# Patient Record
Sex: MALE | Race: OTHER | HISPANIC OR LATINO | URBAN - METROPOLITAN AREA
[De-identification: names, ages, dates, MRNs, and addresses within clinical notes are randomized per-mention and may not be internally consistent; named-entity substitution may affect disease eponyms.]

---

## 2021-11-10 ENCOUNTER — INPATIENT (INPATIENT)
Facility: HOSPITAL | Age: 71
LOS: 9 days | Discharge: HOME HEALTH | End: 2021-11-20
Attending: HOSPITALIST | Admitting: HOSPITALIST
Payer: MEDICARE

## 2021-11-10 VITALS
WEIGHT: 151.9 LBS | DIASTOLIC BLOOD PRESSURE: 73 MMHG | RESPIRATION RATE: 16 BRPM | TEMPERATURE: 98 F | SYSTOLIC BLOOD PRESSURE: 168 MMHG | HEART RATE: 113 BPM | OXYGEN SATURATION: 81 %

## 2021-11-10 DIAGNOSIS — I26.99 OTHER PULMONARY EMBOLISM WITHOUT ACUTE COR PULMONALE: ICD-10-CM

## 2021-11-10 LAB
ALBUMIN SERPL ELPH-MCNC: 2 G/DL — LOW (ref 3.3–5)
ALBUMIN SERPL ELPH-MCNC: 2.4 G/DL — LOW (ref 3.3–5)
ALP SERPL-CCNC: 415 U/L — HIGH (ref 40–120)
ALP SERPL-CCNC: 510 U/L — HIGH (ref 40–120)
ALT FLD-CCNC: 109 U/L — HIGH (ref 12–78)
ALT FLD-CCNC: 136 U/L — HIGH (ref 12–78)
ANION GAP SERPL CALC-SCNC: 7 MMOL/L — SIGNIFICANT CHANGE UP (ref 5–17)
AST SERPL-CCNC: 109 U/L — HIGH (ref 15–37)
AST SERPL-CCNC: 66 U/L — HIGH (ref 15–37)
BASE EXCESS BLDA CALC-SCNC: 0.1 MMOL/L — SIGNIFICANT CHANGE UP (ref -2–2)
BASOPHILS # BLD AUTO: 0.05 K/UL — SIGNIFICANT CHANGE UP (ref 0–0.2)
BASOPHILS NFR BLD AUTO: 0.4 % — SIGNIFICANT CHANGE UP (ref 0–2)
BILIRUB DIRECT SERPL-MCNC: 0.24 MG/DL — HIGH (ref 0.05–0.2)
BILIRUB INDIRECT FLD-MCNC: 0.3 MG/DL — SIGNIFICANT CHANGE UP (ref 0.2–1)
BILIRUB SERPL-MCNC: 0.5 MG/DL — SIGNIFICANT CHANGE UP (ref 0.2–1.2)
BILIRUB SERPL-MCNC: 0.7 MG/DL — SIGNIFICANT CHANGE UP (ref 0.2–1.2)
BLOOD GAS COMMENTS: SIGNIFICANT CHANGE UP
BLOOD GAS COMMENTS: SIGNIFICANT CHANGE UP
BLOOD GAS SOURCE: SIGNIFICANT CHANGE UP
BUN SERPL-MCNC: 17 MG/DL — SIGNIFICANT CHANGE UP (ref 7–23)
CALCIUM SERPL-MCNC: 8.7 MG/DL — SIGNIFICANT CHANGE UP (ref 8.5–10.1)
CHLORIDE SERPL-SCNC: 105 MMOL/L — SIGNIFICANT CHANGE UP (ref 96–108)
CO2 SERPL-SCNC: 27 MMOL/L — SIGNIFICANT CHANGE UP (ref 22–31)
CREAT SERPL-MCNC: 0.85 MG/DL — SIGNIFICANT CHANGE UP (ref 0.5–1.3)
CREAT SERPL-MCNC: 1.04 MG/DL — SIGNIFICANT CHANGE UP (ref 0.5–1.3)
D DIMER BLD IA.RAPID-MCNC: HIGH NG/ML DDU
EOSINOPHIL # BLD AUTO: 0.1 K/UL — SIGNIFICANT CHANGE UP (ref 0–0.5)
EOSINOPHIL NFR BLD AUTO: 0.9 % — SIGNIFICANT CHANGE UP (ref 0–6)
FIBRINOGEN PPP-MCNC: 481 MG/DL — SIGNIFICANT CHANGE UP (ref 290–520)
GLUCOSE SERPL-MCNC: 147 MG/DL — HIGH (ref 70–99)
HCO3 BLDA-SCNC: 23 MMOL/L — SIGNIFICANT CHANGE UP (ref 21–29)
HCT VFR BLD CALC: 38.6 % — LOW (ref 39–50)
HGB BLD-MCNC: 12.7 G/DL — LOW (ref 13–17)
HOROWITZ INDEX BLDA+IHG-RTO: 32 — SIGNIFICANT CHANGE UP
IMM GRANULOCYTES NFR BLD AUTO: 0.4 % — SIGNIFICANT CHANGE UP (ref 0–1.5)
INR BLD: 1.36 RATIO — HIGH (ref 0.88–1.16)
INR BLD: 1.43 RATIO — HIGH (ref 0.88–1.16)
LACTATE SERPL-SCNC: 2.1 MMOL/L — HIGH (ref 0.7–2)
LYMPHOCYTES # BLD AUTO: 1.37 K/UL — SIGNIFICANT CHANGE UP (ref 1–3.3)
LYMPHOCYTES # BLD AUTO: 11.6 % — LOW (ref 13–44)
MCHC RBC-ENTMCNC: 29.5 PG — SIGNIFICANT CHANGE UP (ref 27–34)
MCHC RBC-ENTMCNC: 32.9 GM/DL — SIGNIFICANT CHANGE UP (ref 32–36)
MCV RBC AUTO: 89.8 FL — SIGNIFICANT CHANGE UP (ref 80–100)
MONOCYTES # BLD AUTO: 0.82 K/UL — SIGNIFICANT CHANGE UP (ref 0–0.9)
MONOCYTES NFR BLD AUTO: 7 % — SIGNIFICANT CHANGE UP (ref 2–14)
NEUTROPHILS # BLD AUTO: 9.37 K/UL — HIGH (ref 1.8–7.4)
NEUTROPHILS NFR BLD AUTO: 79.7 % — HIGH (ref 43–77)
NRBC # BLD: 0 /100 WBCS — SIGNIFICANT CHANGE UP (ref 0–0)
NT-PROBNP SERPL-SCNC: 234 PG/ML — HIGH (ref 0–125)
PCO2 BLDA: 34 MMHG — SIGNIFICANT CHANGE UP (ref 32–46)
PH BLD: 7.45 — SIGNIFICANT CHANGE UP (ref 7.35–7.45)
PLATELET # BLD AUTO: 172 K/UL — SIGNIFICANT CHANGE UP (ref 150–400)
PO2 BLDA: 59 MMHG — LOW (ref 74–108)
POTASSIUM SERPL-MCNC: 4.4 MMOL/L — SIGNIFICANT CHANGE UP (ref 3.5–5.3)
POTASSIUM SERPL-SCNC: 4.4 MMOL/L — SIGNIFICANT CHANGE UP (ref 3.5–5.3)
PROT SERPL-MCNC: 6.7 GM/DL — SIGNIFICANT CHANGE UP (ref 6–8.3)
PROT SERPL-MCNC: 8.3 GM/DL — SIGNIFICANT CHANGE UP (ref 6–8.3)
PROTHROM AB SERPL-ACNC: 15.5 SEC — HIGH (ref 10.6–13.6)
PROTHROM AB SERPL-ACNC: 16.3 SEC — HIGH (ref 10.6–13.6)
RBC # BLD: 4.3 M/UL — SIGNIFICANT CHANGE UP (ref 4.2–5.8)
RBC # FLD: 13.4 % — SIGNIFICANT CHANGE UP (ref 10.3–14.5)
SAO2 % BLDA: 89 % — LOW (ref 92–96)
SODIUM SERPL-SCNC: 139 MMOL/L — SIGNIFICANT CHANGE UP (ref 135–145)
TROPONIN I, HIGH SENSITIVITY RESULT: 13.8 NG/L — SIGNIFICANT CHANGE UP
WBC # BLD: 11.76 K/UL — HIGH (ref 3.8–10.5)
WBC # FLD AUTO: 11.76 K/UL — HIGH (ref 3.8–10.5)

## 2021-11-10 PROCEDURE — 93010 ELECTROCARDIOGRAM REPORT: CPT

## 2021-11-10 PROCEDURE — 99291 CRITICAL CARE FIRST HOUR: CPT | Mod: CS

## 2021-11-10 PROCEDURE — 99223 1ST HOSP IP/OBS HIGH 75: CPT

## 2021-11-10 PROCEDURE — 71275 CT ANGIOGRAPHY CHEST: CPT | Mod: 26,MA

## 2021-11-10 RX ORDER — REMDESIVIR 5 MG/ML
INJECTION INTRAVENOUS
Refills: 0 | Status: DISCONTINUED | OUTPATIENT
Start: 2021-11-10 | End: 2021-11-10

## 2021-11-10 RX ORDER — DEXAMETHASONE 0.5 MG/5ML
10 ELIXIR ORAL DAILY
Refills: 0 | Status: DISCONTINUED | OUTPATIENT
Start: 2021-11-10 | End: 2021-11-11

## 2021-11-10 RX ORDER — HEPARIN SODIUM 5000 [USP'U]/ML
INJECTION INTRAVENOUS; SUBCUTANEOUS
Qty: 25000 | Refills: 0 | Status: DISCONTINUED | OUTPATIENT
Start: 2021-11-10 | End: 2021-11-11

## 2021-11-10 RX ORDER — ATORVASTATIN CALCIUM 80 MG/1
20 TABLET, FILM COATED ORAL AT BEDTIME
Refills: 0 | Status: DISCONTINUED | OUTPATIENT
Start: 2021-11-10 | End: 2021-11-20

## 2021-11-10 RX ORDER — HEPARIN SODIUM 5000 [USP'U]/ML
2500 INJECTION INTRAVENOUS; SUBCUTANEOUS EVERY 6 HOURS
Refills: 0 | Status: DISCONTINUED | OUTPATIENT
Start: 2021-11-10 | End: 2021-11-11

## 2021-11-10 RX ORDER — LEVOTHYROXINE SODIUM 125 MCG
75 TABLET ORAL DAILY
Refills: 0 | Status: DISCONTINUED | OUTPATIENT
Start: 2021-11-10 | End: 2021-11-20

## 2021-11-10 RX ORDER — ACETAMINOPHEN 500 MG
650 TABLET ORAL EVERY 4 HOURS
Refills: 0 | Status: DISCONTINUED | OUTPATIENT
Start: 2021-11-10 | End: 2021-11-20

## 2021-11-10 RX ORDER — REMDESIVIR 5 MG/ML
INJECTION INTRAVENOUS
Refills: 0 | Status: COMPLETED | OUTPATIENT
Start: 2021-11-10 | End: 2021-11-15

## 2021-11-10 RX ORDER — REMDESIVIR 5 MG/ML
200 INJECTION INTRAVENOUS EVERY 24 HOURS
Refills: 0 | Status: DISCONTINUED | OUTPATIENT
Start: 2021-11-10 | End: 2021-11-10

## 2021-11-10 RX ORDER — SODIUM CHLORIDE 9 MG/ML
1000 INJECTION, SOLUTION INTRAVENOUS ONCE
Refills: 0 | Status: COMPLETED | OUTPATIENT
Start: 2021-11-10 | End: 2021-11-10

## 2021-11-10 RX ORDER — HEPARIN SODIUM 5000 [USP'U]/ML
5500 INJECTION INTRAVENOUS; SUBCUTANEOUS EVERY 6 HOURS
Refills: 0 | Status: DISCONTINUED | OUTPATIENT
Start: 2021-11-10 | End: 2021-11-11

## 2021-11-10 RX ORDER — DEXAMETHASONE 0.5 MG/5ML
10 ELIXIR ORAL ONCE
Refills: 0 | Status: COMPLETED | OUTPATIENT
Start: 2021-11-10 | End: 2021-11-10

## 2021-11-10 RX ORDER — HEPARIN SODIUM 5000 [USP'U]/ML
5500 INJECTION INTRAVENOUS; SUBCUTANEOUS ONCE
Refills: 0 | Status: COMPLETED | OUTPATIENT
Start: 2021-11-10 | End: 2021-11-10

## 2021-11-10 RX ORDER — ATORVASTATIN CALCIUM 80 MG/1
1 TABLET, FILM COATED ORAL
Qty: 0 | Refills: 0 | DISCHARGE

## 2021-11-10 RX ORDER — LEVOTHYROXINE SODIUM 125 MCG
1 TABLET ORAL
Qty: 0 | Refills: 0 | DISCHARGE

## 2021-11-10 RX ORDER — REMDESIVIR 5 MG/ML
200 INJECTION INTRAVENOUS EVERY 24 HOURS
Refills: 0 | Status: COMPLETED | OUTPATIENT
Start: 2021-11-10 | End: 2021-11-11

## 2021-11-10 RX ADMIN — HEPARIN SODIUM 5500 UNIT(S): 5000 INJECTION INTRAVENOUS; SUBCUTANEOUS at 23:34

## 2021-11-10 RX ADMIN — HEPARIN SODIUM 1200 UNIT(S)/HR: 5000 INJECTION INTRAVENOUS; SUBCUTANEOUS at 23:34

## 2021-11-10 RX ADMIN — SODIUM CHLORIDE 1000 MILLILITER(S): 9 INJECTION, SOLUTION INTRAVENOUS at 21:58

## 2021-11-10 RX ADMIN — SODIUM CHLORIDE 1000 MILLILITER(S): 9 INJECTION, SOLUTION INTRAVENOUS at 23:35

## 2021-11-10 NOTE — ED PROVIDER NOTE - CLINICAL SUMMARY MEDICAL DECISION MAKING FREE TEXT BOX
hypoxia from covid. candidate for dex and remdes. rule out pe. admit. hypoxia from covid. candidate for dex and remdes. rule out pe. admit.  cta shows pe. start heparin.

## 2021-11-10 NOTE — H&P ADULT - NSHPPHYSICALEXAM_GEN_ALL_CORE
PHYSICAL EXAM:  General: in no acute distress  Eyes: PERRLA, EOMI; conjunctiva and sclera clear  Head: Normocephalic; atraumatic  ENMT: No nasal discharge; airway clear  Neck: Supple; non tender; no masses  Respiratory: bibasilar crackles with expiratory wheezing heard loudest towards the base  Cardiovascular: Regular rate and rhythm. S1 and S2 Normal; No murmurs, gallops or rubs  Gastrointestinal: Soft non-tender non-distended; Normal bowel sounds  Extremities: Normal range of motion, No clubbing, cyanosis or edema  Vascular: Peripheral pulses palpable 2+ bilaterally  Neurological: Alert and oriented x4  Psychiatric: Cooperative and appropriate

## 2021-11-10 NOTE — ED PROVIDER NOTE - PHYSICAL EXAMINATION
Gen: Alert, NAD  Head: NC, AT   Eyes: PERRL, EOMI, normal lids/conjunctiva  ENT: normal hearing, patent oropharynx without erythema/exudate, uvula midline  Neck: supple, no tenderness, Trachea midline  Pulm: bl crackles   CV: tachycardia. no M/R/G, 2+ radial and dp pulses bl, no edema  Abd: soft, NT/ND, +BS, no hepatosplenomegaly  Mskel: extremities x4 with normal ROM and no joint effusions. no ctl spine ttp.   Skin: no rash, no bruising   Neuro: AAOx3, no sensory/motor deficits, CN 2-12 intact

## 2021-11-10 NOTE — ED PROVIDER NOTE - OBJECTIVE STATEMENT
71m hx emphysema pw sob. 8 days ago developed ha and then cough prod of yellow phlegm with blood tinge. last night developed sob and weakness. poor appetite as well. o2 sat at home was in the 70s. ros neg for ha, vision loss, rhinorrhea, cp, abd pain, vomiting, fever, chills, numbness, dysuria, back pain, testicular pain  has not taken any medications for symptoms

## 2021-11-10 NOTE — H&P ADULT - NSHPREVIEWOFSYSTEMS_GEN_ALL_CORE
REVIEW OF SYSTEMS  General: denies weakness, malaise  Skin/Breast: no new rash  Ophthalmologic: no change in vision  ENMT: no dysphagia, throat pain or change in hearing  Respiratory and Thorax: +difficulty breathing ; + pleuritic chest pain  Cardiovascular: no palpitations or PND, orthopnea  Gastrointestinal: no abdominal pain, normal bowel movement, no dark stools  Genitourinary: no difficulty urinating, no burning urination  Musculoskeletal: no myalgia/arthrlagia  Neurological: no weakness, numbness, change in gait  Psychiatric: no depression, anxiety  Hematology/Lymphatics: denies easy bruising or bleeding  Endocrine: no polyuria, polydipsia

## 2021-11-10 NOTE — H&P ADULT - HISTORY OF PRESENT ILLNESS
70 yo male with history of HLD, hypothyroidism who presents from home after testing positive for COVID on 11/4. Patient went to PCP on 11/4 and got tested, family says they just only got the results today. He has had increased SOB and cough at home. Pleuritic chest pain bilaterally. He did receive the first dose of the moderna vaccine on 10/4 and was due to get the next dose last week before may symptoms. At this time patient feels better on oxygen. He is 92% on 4LPM via NC. CT chest with contrast on admission showed bilateral subsegmental PE and patient started on heparin drip. Medicine called for admission.

## 2021-11-10 NOTE — H&P ADULT - ASSESSMENT
72 yo male with history of HLD, hypothyroidism who presents from home after testing positive for COVID on 11/4. Patient went to PCP on 11/4 and got tested, family says they just only got the results today. He has had increased SOB and cough at home. Pleuritic chest pain bilaterally. He did receive the first dose of the moderna vaccine on 10/4 and was due to get the next dose last week before may symptoms. At this time patient feels better on oxygen. He is 92% on 4LPM via NC. CT chest with contrast on admission showed bilateral subsegmental PE and patient started on heparin drip. Medicine called for admission.    #acute hypoxemic resp failure 2/2 COVID 19 pneumonia  - admit to tele  - started on dexamethasone 10mg IVPB by ED on admission  - continue dex 6mg daily for 5 days  - start on remdesivir  - consult ID if deteriorates  - incentive spirometer; instructed on use  - proning encouraged  - OOB to chair when awake  - albuterol PRN  - oxygen to be titrated as allows    #bilateral PE  - continue with heparin IV  - if remains stable can transition to BID lovenox    #hypothyroidism  - continue levothyroxine    #hyperlipidemia  - continue atorvastatin    #DVT ppx  - full dose AC as above

## 2021-11-10 NOTE — ED ADULT NURSE NOTE - ED STAT RN HANDOFF DETAILS
Report given to receiving RN francesca ,pts history, current condition and reason for admission discussed, safety concerns addressed and reviewed, pt currently in stable condition, IV flushes for patency and site shows no signs or symptoms of infiltrate, dressing is clean dry and intact, pt is aware of plan of care. Pt education deemed successful at time of report after patient demonstrates successful teach back for proficiency. adult nurse note done in downtime notes.

## 2021-11-11 DIAGNOSIS — R79.89 OTHER SPECIFIED ABNORMAL FINDINGS OF BLOOD CHEMISTRY: ICD-10-CM

## 2021-11-11 DIAGNOSIS — U07.1 COVID-19: ICD-10-CM

## 2021-11-11 DIAGNOSIS — K21.9 GASTRO-ESOPHAGEAL REFLUX DISEASE WITHOUT ESOPHAGITIS: ICD-10-CM

## 2021-11-11 DIAGNOSIS — J96.01 ACUTE RESPIRATORY FAILURE WITH HYPOXIA: ICD-10-CM

## 2021-11-11 DIAGNOSIS — I26.99 OTHER PULMONARY EMBOLISM WITHOUT ACUTE COR PULMONALE: ICD-10-CM

## 2021-11-11 LAB
A1C WITH ESTIMATED AVERAGE GLUCOSE RESULT: 6 % — HIGH (ref 4–5.6)
ALBUMIN SERPL ELPH-MCNC: 1.9 G/DL — LOW (ref 3.3–5)
ALBUMIN SERPL ELPH-MCNC: 1.9 G/DL — LOW (ref 3.3–5)
ALP SERPL-CCNC: 402 U/L — HIGH (ref 40–120)
ALP SERPL-CCNC: 402 U/L — HIGH (ref 40–120)
ALT FLD-CCNC: 111 U/L — HIGH (ref 12–78)
ALT FLD-CCNC: 111 U/L — HIGH (ref 12–78)
ANION GAP SERPL CALC-SCNC: 6 MMOL/L — SIGNIFICANT CHANGE UP (ref 5–17)
APTT BLD: 105.8 SEC — HIGH (ref 27.5–35.5)
APTT BLD: 79.1 SEC — HIGH (ref 27.5–35.5)
APTT BLD: 82.8 SEC — HIGH (ref 27.5–35.5)
APTT BLD: 95.8 SEC — HIGH (ref 27.5–35.5)
AST SERPL-CCNC: 70 U/L — HIGH (ref 15–37)
AST SERPL-CCNC: 70 U/L — HIGH (ref 15–37)
BILIRUB DIRECT SERPL-MCNC: 0.16 MG/DL — SIGNIFICANT CHANGE UP (ref 0.05–0.2)
BILIRUB INDIRECT FLD-MCNC: 0.1 MG/DL — LOW (ref 0.2–1)
BILIRUB SERPL-MCNC: 0.3 MG/DL — SIGNIFICANT CHANGE UP (ref 0.2–1.2)
BILIRUB SERPL-MCNC: 0.3 MG/DL — SIGNIFICANT CHANGE UP (ref 0.2–1.2)
BUN SERPL-MCNC: 12 MG/DL — SIGNIFICANT CHANGE UP (ref 7–23)
CALCIUM SERPL-MCNC: 8.1 MG/DL — LOW (ref 8.5–10.1)
CHLORIDE SERPL-SCNC: 110 MMOL/L — HIGH (ref 96–108)
CO2 SERPL-SCNC: 24 MMOL/L — SIGNIFICANT CHANGE UP (ref 22–31)
CREAT SERPL-MCNC: 0.73 MG/DL — SIGNIFICANT CHANGE UP (ref 0.5–1.3)
CREAT SERPL-MCNC: 0.73 MG/DL — SIGNIFICANT CHANGE UP (ref 0.5–1.3)
ESTIMATED AVERAGE GLUCOSE: 126 MG/DL — HIGH (ref 68–114)
GLUCOSE SERPL-MCNC: 182 MG/DL — HIGH (ref 70–99)
HCT VFR BLD CALC: 33 % — LOW (ref 39–50)
HCV AB S/CO SERPL IA: 0.23 S/CO — SIGNIFICANT CHANGE UP (ref 0–0.99)
HCV AB SERPL-IMP: SIGNIFICANT CHANGE UP
HGB BLD-MCNC: 10.7 G/DL — LOW (ref 13–17)
INR BLD: 1.42 RATIO — HIGH (ref 0.88–1.16)
MCHC RBC-ENTMCNC: 29.3 PG — SIGNIFICANT CHANGE UP (ref 27–34)
MCHC RBC-ENTMCNC: 32.4 GM/DL — SIGNIFICANT CHANGE UP (ref 32–36)
MCV RBC AUTO: 90.4 FL — SIGNIFICANT CHANGE UP (ref 80–100)
NRBC # BLD: 0 /100 WBCS — SIGNIFICANT CHANGE UP (ref 0–0)
PLATELET # BLD AUTO: 180 K/UL — SIGNIFICANT CHANGE UP (ref 150–400)
POTASSIUM SERPL-MCNC: 4.6 MMOL/L — SIGNIFICANT CHANGE UP (ref 3.5–5.3)
POTASSIUM SERPL-SCNC: 4.6 MMOL/L — SIGNIFICANT CHANGE UP (ref 3.5–5.3)
PROT SERPL-MCNC: 6.8 GM/DL — SIGNIFICANT CHANGE UP (ref 6–8.3)
PROT SERPL-MCNC: 6.8 GM/DL — SIGNIFICANT CHANGE UP (ref 6–8.3)
PROTHROM AB SERPL-ACNC: 16.2 SEC — HIGH (ref 10.6–13.6)
RBC # BLD: 3.65 M/UL — LOW (ref 4.2–5.8)
RBC # FLD: 13.3 % — SIGNIFICANT CHANGE UP (ref 10.3–14.5)
SODIUM SERPL-SCNC: 140 MMOL/L — SIGNIFICANT CHANGE UP (ref 135–145)
WBC # BLD: 7.62 K/UL — SIGNIFICANT CHANGE UP (ref 3.8–10.5)
WBC # FLD AUTO: 7.62 K/UL — SIGNIFICANT CHANGE UP (ref 3.8–10.5)

## 2021-11-11 PROCEDURE — 99223 1ST HOSP IP/OBS HIGH 75: CPT

## 2021-11-11 PROCEDURE — 99233 SBSQ HOSP IP/OBS HIGH 50: CPT

## 2021-11-11 RX ORDER — HEPARIN SODIUM 5000 [USP'U]/ML
1200 INJECTION INTRAVENOUS; SUBCUTANEOUS
Qty: 25000 | Refills: 0 | Status: DISCONTINUED | OUTPATIENT
Start: 2021-11-11 | End: 2021-11-15

## 2021-11-11 RX ORDER — HEPARIN SODIUM 5000 [USP'U]/ML
6000 INJECTION INTRAVENOUS; SUBCUTANEOUS ONCE
Refills: 0 | Status: DISCONTINUED | OUTPATIENT
Start: 2021-11-11 | End: 2021-11-11

## 2021-11-11 RX ORDER — REMDESIVIR 5 MG/ML
100 INJECTION INTRAVENOUS EVERY 24 HOURS
Refills: 0 | Status: COMPLETED | OUTPATIENT
Start: 2021-11-12 | End: 2021-11-15

## 2021-11-11 RX ORDER — HEPARIN SODIUM 5000 [USP'U]/ML
6000 INJECTION INTRAVENOUS; SUBCUTANEOUS EVERY 6 HOURS
Refills: 0 | Status: DISCONTINUED | OUTPATIENT
Start: 2021-11-11 | End: 2021-11-15

## 2021-11-11 RX ORDER — DEXAMETHASONE 0.5 MG/5ML
6 ELIXIR ORAL DAILY
Refills: 0 | Status: COMPLETED | OUTPATIENT
Start: 2021-11-11 | End: 2021-11-15

## 2021-11-11 RX ORDER — HEPARIN SODIUM 5000 [USP'U]/ML
3000 INJECTION INTRAVENOUS; SUBCUTANEOUS EVERY 6 HOURS
Refills: 0 | Status: DISCONTINUED | OUTPATIENT
Start: 2021-11-11 | End: 2021-11-15

## 2021-11-11 RX ADMIN — ATORVASTATIN CALCIUM 20 MILLIGRAM(S): 80 TABLET, FILM COATED ORAL at 22:02

## 2021-11-11 RX ADMIN — HEPARIN SODIUM 1200 UNIT(S)/HR: 5000 INJECTION INTRAVENOUS; SUBCUTANEOUS at 08:38

## 2021-11-11 RX ADMIN — Medication 75 MICROGRAM(S): at 05:25

## 2021-11-11 RX ADMIN — Medication 6 MILLIGRAM(S): at 05:24

## 2021-11-11 RX ADMIN — HEPARIN SODIUM 1200 UNIT(S)/HR: 5000 INJECTION INTRAVENOUS; SUBCUTANEOUS at 23:58

## 2021-11-11 RX ADMIN — HEPARIN SODIUM 1200 UNIT(S)/HR: 5000 INJECTION INTRAVENOUS; SUBCUTANEOUS at 16:28

## 2021-11-11 RX ADMIN — REMDESIVIR 500 MILLIGRAM(S): 5 INJECTION INTRAVENOUS at 05:24

## 2021-11-11 RX ADMIN — Medication 30 MILLILITER(S): at 17:44

## 2021-11-11 NOTE — PROGRESS NOTE ADULT - ASSESSMENT
72 yo male with history of HLD, hypothyroidism who presents from home after testing positive for COVID on 11/4. Patient went to PCP on 11/4 and got tested, family says they just only got the results today. He has had increased SOB and cough at home. Pleuritic chest pain bilaterally. He did receive the first dose of the moderna vaccine on 10/4 and was due to get the next dose last week before may symptoms. At this time patient feels better on oxygen. He is 92% on 4LPM via NC. CT chest with contrast on admission showed bilateral subsegmental PE and patient started on heparin drip. Medicine called for admission.    #acute hypoxemic resp failure 2/2 COVID 19 pneumonia    -continue on dexamethasone ,  remdesivir  - consult ID and pulm   incentive spirometer; instructed on use  - proning encouraged  - OOB to chair when awake  - albuterol PRN  - oxygen to be titrated as allows    #bilateral PE  - continue with heparin IV  - if remains stable can transition to BID lovenox    #hypothyroidism  - continue levothyroxine    #hyperlipidemia  - continue atorvastatin    #DVT ppx  - full dose AC as above 70 yo male with history of HLD, hypothyroidism who presents from home after testing positive for COVID on 11/4. Patient went to PCP on 11/4 and got tested, family says they just only got the results today. He has had increased SOB and cough at home. Pleuritic chest pain bilaterally. He did receive the first dose of the moderna vaccine on 10/4 and was due to get the next dose last week before may symptoms. At this time patient feels better on oxygen. He is 92% on 4LPM via NC. CT chest with contrast on admission showed bilateral subsegmental PE and patient started on heparin drip. Medicine called for admission.    #acute hypoxemic resp failure 2/2 COVID 19 pneumonia and sepsis/poa    -continue on dexamethasone ,  remdesivir  - consult ID and pulm   incentive spirometer; instructed on use  - proning encouraged  - OOB to chair when awake  - albuterol PRN  - oxygen to be titrated as allows    #bilateral PE  - continue with heparin IV  - if remains stable can transition to BID lovenox    #hypothyroidism  - continue levothyroxine    #hyperlipidemia  - continue atorvastatin    #DVT ppx  - full dose AC as above

## 2021-11-11 NOTE — CONSULT NOTE ADULT - ASSESSMENT
72 yo male with history of HLD, hypothyroidism who presents from home after testing positive for COVID on 11/4.   His symptoms started a week before halloween.   Presented hypoxic requiring 4L nasal cannula and was quicklly moved to [facility] where he feels more comfortable and saturation is better.   CTA (I personally reviewed) on admission showed bilateral subsegmental PE as well as GGO bilaterally and patient started on heparin drip.     #acute hypoxemic resp failure 2/2 COVID 19 pneumonia  #pulmonary embolism    #high serum lactate  #Gerd    #COVID  Monitor clinically.  Monitor Oxygenation  O2 supplementation.  Remdesivir - up to 5 days depending on clinical course.  Monitor CBC, CMP daily  Ferritin, CRP, and D dimer q 48 hrs.  IV Dexamethasone 6mg q24hrs x10 days  full dose Anticoagulation for pulmonary embolism   Treatment options are limited-this as well as limitations of data discussed with pt.  Monitor for any bacterial superinfection/complications.  discussed tocilizumab with patient and daughter: since he has been sick for more then 2 weeks and the benefit is small with a infectious risks we came to the conclusion of not giving tocilizumab   This tx plan was formulated utilizing my clinical judgement, currently available local/regional anecdotal information, organizational treatment recommendations with COVID-19 specific considerations given rapidly changing information available.     #PE  AC per medicine  consider changing to full dose lovenox   wean oxygen   Transthoracic Echocardiogram     #lactate   was elevated on admission likely related to hypoxia  improved with oxygen and fluids    #GERD  patient takes omeprazole at home  he is on high dose steroids which he feels is worsening his gastritis  please start PPI     D/W Dr. Ibanez and patients daughter    Vinay Vernon, DO  Infectious Disease Attending  Pager [phone]  After 5pm/weekends please call [phone] for all inquiries and new consults

## 2021-11-11 NOTE — PROGRESS NOTE ADULT - SUBJECTIVE AND OBJECTIVE BOX
Patient is a 71y old  Male who presents with a chief complaint of     OVERNIGHT EVENTS:    MEDICATIONS  (STANDING):  atorvastatin 20 milliGRAM(s) Oral at bedtime  dexAMETHasone  Injectable 6 milliGRAM(s) IV Push daily  heparin  Infusion.  Unit(s)/Hr (12 mL/Hr) IV Continuous <Continuous>  levothyroxine 75 MICROGram(s) Oral daily  remdesivir  IVPB   IV Intermittent     MEDICATIONS  (PRN):  acetaminophen     Tablet .. 650 milliGRAM(s) Oral every 4 hours PRN Temp greater or equal to 38.5C (101.3F)  heparin   Injectable 5500 Unit(s) IV Push every 6 hours PRN For aPTT less than 40  heparin   Injectable 2500 Unit(s) IV Push every 6 hours PRN For aPTT between 40 - 57      Allergies    No Known Allergies    Intolerances        SUBJECTIVE: in bed in NAD, no acute events overnight     T(F): 97.4 (11-11-21 @ 13:09), Max: 98.2 (11-10-21 @ 19:40)  HR: 78 (11-11-21 @ 13:09) (64 - 113)  BP: 114/62 (11-11-21 @ 13:09) (99/59 - 168/73)  RR: 18 (11-11-21 @ 13:09) (15 - 21)  SpO2: 93% (11-11-21 @ 13:09) (81% - 97%)  Wt(kg): --    PHYSICAL EXAM:  GENERAL: NAD, well-groomed, well-developed  HEAD:  Atraumatic, Normocephalic  EYES: EOMI, PERRLA, conjunctiva and sclera clear  ENMT: No tonsillar erythema, exudates, or enlargement; Moist mucous membranes, Good dentition, No lesions  NECK: Supple, No JVD, Normal thyroid  CHEST/LUNG: Clear to  auscultation bilaterally; No rales, rhonchi, wheezing, or rubs  bilaterally  HEART: Regular rate and rhythm; No murmurs, rubs, or gallops  ABDOMEN: Soft, Nontender, Nondistended; Bowel sounds present  EXTREMITIES:  2+ Peripheral Pulses, No clubbing, cyanosis, or edema BL LE  SKIN: No rashes or lesions  NERVOUS SYSTEM:  Alert & Oriented X3, Good concentration; Motor Strength 5/5 B/L upper and lower extremities;   DTRs 2+ intact and symmetric, sensation intact BL    LABS:                        10.7   7.62  )-----------( 180      ( 11 Nov 2021 07:01 )             33.0     11-11    140  |  110<H>  |  12  ----------------------------<  182<H>  4.6   |  24  |  0.73    Ca    8.1<L>      11 Nov 2021 07:01    TPro  6.8  /  Alb  1.9<L>  /  TBili  0.3  /  DBili  .16  /  AST  70<H>  /  ALT  111<H>  /  AlkPhos  402<H>  11-11    PT/INR - ( 11 Nov 2021 07:01 )   PT: 16.2 sec;   INR: 1.42 ratio         PTT - ( 11 Nov 2021 09:49 )  PTT:105.8 sec    Cultures;   CAPILLARY BLOOD GLUCOSE        Lipid panel:           RADIOLOGY & ADDITIONAL TESTS:  < from: CT Angio Chest PE Protocol w/ IV Cont (11.10.21 @ 22:27) >  IMPRESSION:    Bilateral segmental and subsegmental pulmonary emboli in all lobes. No evidence of right heart strain.    Pulmonary findings typical of Covid 19 pneumonia.    Mild mediastinal adenopathy, likely reactive.    Findings discussed with JORDEN Orellana via phone on 11/10/2021 10:30 PM by Dr. Costa Huynh with read back confirmation.        < end of copied text >      Imaging Personally Reviewed:  [ x] YES      Consultant(s) Notes Reviewed:  [x ] YES     Care Discussed with [x ] Consultants [X ] Patient [x ] Family  [x ]    [x ]  Other; RN

## 2021-11-12 LAB
ALBUMIN SERPL ELPH-MCNC: 2 G/DL — LOW (ref 3.3–5)
ALBUMIN SERPL ELPH-MCNC: 2 G/DL — LOW (ref 3.3–5)
ALP SERPL-CCNC: 447 U/L — HIGH (ref 40–120)
ALP SERPL-CCNC: 447 U/L — HIGH (ref 40–120)
ALT FLD-CCNC: 127 U/L — HIGH (ref 12–78)
ALT FLD-CCNC: 127 U/L — HIGH (ref 12–78)
ANION GAP SERPL CALC-SCNC: 5 MMOL/L — SIGNIFICANT CHANGE UP (ref 5–17)
APTT BLD: 77.7 SEC — HIGH (ref 27.5–35.5)
APTT BLD: 82.4 SEC — HIGH (ref 27.5–35.5)
AST SERPL-CCNC: 81 U/L — HIGH (ref 15–37)
AST SERPL-CCNC: 81 U/L — HIGH (ref 15–37)
BILIRUB DIRECT SERPL-MCNC: 0.19 MG/DL — SIGNIFICANT CHANGE UP (ref 0.05–0.2)
BILIRUB INDIRECT FLD-MCNC: 0.2 MG/DL — SIGNIFICANT CHANGE UP (ref 0.2–1)
BILIRUB SERPL-MCNC: 0.4 MG/DL — SIGNIFICANT CHANGE UP (ref 0.2–1.2)
BILIRUB SERPL-MCNC: 0.4 MG/DL — SIGNIFICANT CHANGE UP (ref 0.2–1.2)
BUN SERPL-MCNC: 18 MG/DL — SIGNIFICANT CHANGE UP (ref 7–23)
CALCIUM SERPL-MCNC: 8.1 MG/DL — LOW (ref 8.5–10.1)
CHLORIDE SERPL-SCNC: 108 MMOL/L — SIGNIFICANT CHANGE UP (ref 96–108)
CO2 SERPL-SCNC: 26 MMOL/L — SIGNIFICANT CHANGE UP (ref 22–31)
COVID-19 NUCLEOCAPSID GAM AB INTERP: POSITIVE
COVID-19 NUCLEOCAPSID TOTAL GAM ANTIBODY RESULT: 53.1 INDEX — HIGH
COVID-19 SPIKE DOMAIN AB INTERP: POSITIVE
COVID-19 SPIKE DOMAIN ANTIBODY RESULT: >250 U/ML — HIGH
CREAT SERPL-MCNC: 0.72 MG/DL — SIGNIFICANT CHANGE UP (ref 0.5–1.3)
CREAT SERPL-MCNC: 0.72 MG/DL — SIGNIFICANT CHANGE UP (ref 0.5–1.3)
GLUCOSE BLDC GLUCOMTR-MCNC: 129 MG/DL — HIGH (ref 70–99)
GLUCOSE BLDC GLUCOMTR-MCNC: 135 MG/DL — HIGH (ref 70–99)
GLUCOSE BLDC GLUCOMTR-MCNC: 172 MG/DL — HIGH (ref 70–99)
GLUCOSE SERPL-MCNC: 129 MG/DL — HIGH (ref 70–99)
HCT VFR BLD CALC: 33.1 % — LOW (ref 39–50)
HGB BLD-MCNC: 10.8 G/DL — LOW (ref 13–17)
INR BLD: 1.38 RATIO — HIGH (ref 0.88–1.16)
MAGNESIUM SERPL-MCNC: 2.2 MG/DL — SIGNIFICANT CHANGE UP (ref 1.6–2.6)
MCHC RBC-ENTMCNC: 29.2 PG — SIGNIFICANT CHANGE UP (ref 27–34)
MCHC RBC-ENTMCNC: 32.6 GM/DL — SIGNIFICANT CHANGE UP (ref 32–36)
MCV RBC AUTO: 89.5 FL — SIGNIFICANT CHANGE UP (ref 80–100)
NRBC # BLD: 0 /100 WBCS — SIGNIFICANT CHANGE UP (ref 0–0)
PHOSPHATE SERPL-MCNC: 2.6 MG/DL — SIGNIFICANT CHANGE UP (ref 2.5–4.5)
PLATELET # BLD AUTO: 206 K/UL — SIGNIFICANT CHANGE UP (ref 150–400)
POTASSIUM SERPL-MCNC: 4.2 MMOL/L — SIGNIFICANT CHANGE UP (ref 3.5–5.3)
POTASSIUM SERPL-SCNC: 4.2 MMOL/L — SIGNIFICANT CHANGE UP (ref 3.5–5.3)
PROT SERPL-MCNC: 6.7 GM/DL — SIGNIFICANT CHANGE UP (ref 6–8.3)
PROT SERPL-MCNC: 6.7 GM/DL — SIGNIFICANT CHANGE UP (ref 6–8.3)
PROTHROM AB SERPL-ACNC: 15.8 SEC — HIGH (ref 10.6–13.6)
RBC # BLD: 3.7 M/UL — LOW (ref 4.2–5.8)
RBC # FLD: 13.4 % — SIGNIFICANT CHANGE UP (ref 10.3–14.5)
SARS-COV-2 IGG+IGM SERPL QL IA: 53.1 INDEX — HIGH
SARS-COV-2 IGG+IGM SERPL QL IA: >250 U/ML — HIGH
SARS-COV-2 IGG+IGM SERPL QL IA: POSITIVE
SARS-COV-2 IGG+IGM SERPL QL IA: POSITIVE
SODIUM SERPL-SCNC: 139 MMOL/L — SIGNIFICANT CHANGE UP (ref 135–145)
WBC # BLD: 14.82 K/UL — HIGH (ref 3.8–10.5)
WBC # FLD AUTO: 14.82 K/UL — HIGH (ref 3.8–10.5)

## 2021-11-12 PROCEDURE — 99233 SBSQ HOSP IP/OBS HIGH 50: CPT

## 2021-11-12 RX ORDER — DEXTROSE 50 % IN WATER 50 %
25 SYRINGE (ML) INTRAVENOUS ONCE
Refills: 0 | Status: DISCONTINUED | OUTPATIENT
Start: 2021-11-12 | End: 2021-11-20

## 2021-11-12 RX ORDER — DEXTROSE 50 % IN WATER 50 %
12.5 SYRINGE (ML) INTRAVENOUS ONCE
Refills: 0 | Status: DISCONTINUED | OUTPATIENT
Start: 2021-11-12 | End: 2021-11-20

## 2021-11-12 RX ORDER — INSULIN LISPRO 100/ML
VIAL (ML) SUBCUTANEOUS AT BEDTIME
Refills: 0 | Status: DISCONTINUED | OUTPATIENT
Start: 2021-11-12 | End: 2021-11-20

## 2021-11-12 RX ORDER — INSULIN LISPRO 100/ML
VIAL (ML) SUBCUTANEOUS
Refills: 0 | Status: DISCONTINUED | OUTPATIENT
Start: 2021-11-12 | End: 2021-11-20

## 2021-11-12 RX ORDER — SODIUM CHLORIDE 9 MG/ML
1000 INJECTION, SOLUTION INTRAVENOUS
Refills: 0 | Status: DISCONTINUED | OUTPATIENT
Start: 2021-11-12 | End: 2021-11-20

## 2021-11-12 RX ORDER — GLUCAGON INJECTION, SOLUTION 0.5 MG/.1ML
1 INJECTION, SOLUTION SUBCUTANEOUS ONCE
Refills: 0 | Status: DISCONTINUED | OUTPATIENT
Start: 2021-11-12 | End: 2021-11-20

## 2021-11-12 RX ORDER — ALBUTEROL 90 UG/1
2 AEROSOL, METERED ORAL EVERY 6 HOURS
Refills: 0 | Status: DISCONTINUED | OUTPATIENT
Start: 2021-11-12 | End: 2021-11-20

## 2021-11-12 RX ORDER — PANTOPRAZOLE SODIUM 20 MG/1
40 TABLET, DELAYED RELEASE ORAL
Refills: 0 | Status: DISCONTINUED | OUTPATIENT
Start: 2021-11-12 | End: 2021-11-20

## 2021-11-12 RX ORDER — DEXTROSE 50 % IN WATER 50 %
15 SYRINGE (ML) INTRAVENOUS ONCE
Refills: 0 | Status: DISCONTINUED | OUTPATIENT
Start: 2021-11-12 | End: 2021-11-20

## 2021-11-12 RX ADMIN — Medication 6 MILLIGRAM(S): at 05:02

## 2021-11-12 RX ADMIN — REMDESIVIR 500 MILLIGRAM(S): 5 INJECTION INTRAVENOUS at 05:02

## 2021-11-12 RX ADMIN — ATORVASTATIN CALCIUM 20 MILLIGRAM(S): 80 TABLET, FILM COATED ORAL at 21:42

## 2021-11-12 RX ADMIN — Medication 30 MILLILITER(S): at 15:42

## 2021-11-12 RX ADMIN — ALBUTEROL 2 PUFF(S): 90 AEROSOL, METERED ORAL at 19:12

## 2021-11-12 RX ADMIN — HEPARIN SODIUM 1200 UNIT(S)/HR: 5000 INJECTION INTRAVENOUS; SUBCUTANEOUS at 15:57

## 2021-11-12 RX ADMIN — Medication 75 MICROGRAM(S): at 05:02

## 2021-11-12 RX ADMIN — HEPARIN SODIUM 1200 UNIT(S)/HR: 5000 INJECTION INTRAVENOUS; SUBCUTANEOUS at 07:47

## 2021-11-12 NOTE — PROGRESS NOTE ADULT - ASSESSMENT
72 yo male with history of HLD, hypothyroidism who presents from home after testing positive for COVID on 11/4.   His symptoms started a week before Halloween   Presented hypoxic requiring 4L nasal cannula and was quicklly moved to [facility] where he feels more comfortable and saturation is better.   CTA (I personally reviewed) on admission showed bilateral subsegmental PE as well as GGO bilaterally and patient started on heparin drip.     11/12: still on [facility], doing better, remains on heparin drip mil d transaminitis      #acute hypoxemic resp failure 2/2 COVID 19 pneumonia  #pulmonary embolism    #high serum lactate  #Gerd    #COVID  Monitor clinically.  Monitor Oxygenation  O2 supplementation.  Remdesivir - up to 5 days depending on clinical course.  Monitor CBC, CMP daily  Ferritin, CRP, and D dimer q 48 hrs.  IV Dexamethasone 6mg q24hrs x10 days  full dose Anticoagulation for pulmonary embolism   Treatment options are limited-this as well as limitations of data discussed with pt.  Monitor for any bacterial superinfection/complications.  discussed tocilizumab with patient and daughter: since he has been sick for more then 2 weeks and the benefit is small with a infectious risks we came to the conclusion of not giving tocilizumab   This tx plan was formulated utilizing my clinical judgement, currently available local/regional anecdotal information, organizational treatment recommendations with COVID-19 specific considerations given rapidly changing information available.    chest patient, proning as tolerated     #PE  AC per medicine  consider changing to full dose lovenox   wean oxygen   consider Transthoracic Echocardiogram     #lactate   was elevated on admission likely related to hypoxia  improved with oxygen and fluids    #GERD  patient takes omeprazole at home  he is on high dose steroids which he feels is worsening his gastritis  please start PPI     D/W Dr. Ibanez and patients daughter    Vinay Vernon,   Infectious Disease Attending  Pager [phone]  After 5pm/weekends please call [phone] for all inquiries and new consults

## 2021-11-12 NOTE — PHARMACOTHERAPY INTERVENTION NOTE - COMMENTS
Recommended initiating insulin sliding scale while patient is receiving dexamethasone for COVID management.

## 2021-11-12 NOTE — PROGRESS NOTE ADULT - SUBJECTIVE AND OBJECTIVE BOX
ARTHUR ZAZUETA  MRN-[id number]      Follow Up:  COVID, pulmonary embolism     Interval History: patient seen and examined he reports feeling much better, minimal cough otherwise ROS is negative     ROS:    [ ] Unobtainable because:  [ x] All other systems negative except as noted    Constitutional: no fever, no chills  Head: no trauma  Eyes: no vision changes, no eye pain  ENT:  no sore throat, no rhinorrhea  Cardiovascular:  no chest pain, no palpitation  Respiratory:  no SOB, no cough  GI:  no abd pain, no vomiting, no diarrhea  urinary: no dysuria, no hematuria, no flank pain  musculoskeletal:  no joint pain, no joint swelling  skin:  no rash  neurology:  no headache, no seizure, no change in mental status  psych: no anxiety, no depression         Allergies  No Known Allergies        ANTIMICROBIALS:  remdesivir  IVPB    remdesivir  IVPB 100 every 24 hours      OTHER MEDS:  acetaminophen     Tablet .. 650 milliGRAM(s) Oral every 4 hours PRN  atorvastatin 20 milliGRAM(s) Oral at bedtime  dexAMETHasone  Injectable 6 milliGRAM(s) IV Push daily  dextrose 40% Gel 15 Gram(s) Oral once  dextrose 5%. 1000 milliLiter(s) IV Continuous <Continuous>  dextrose 5%. 1000 milliLiter(s) IV Continuous <Continuous>  dextrose 50% Injectable 25 Gram(s) IV Push once  dextrose 50% Injectable 12.5 Gram(s) IV Push once  dextrose 50% Injectable 25 Gram(s) IV Push once  glucagon  Injectable 1 milliGRAM(s) IntraMuscular once  heparin   Injectable 6000 Unit(s) IV Push every 6 hours PRN  heparin   Injectable 3000 Unit(s) IV Push every 6 hours PRN  heparin  Infusion. 1200 Unit(s)/Hr IV Continuous <Continuous>  insulin lispro (ADMELOG) corrective regimen sliding scale   SubCutaneous three times a day before meals  insulin lispro (ADMELOG) corrective regimen sliding scale   SubCutaneous at bedtime  levothyroxine 75 MICROGram(s) Oral daily      Physical Exam:  Vital Signs Last 24 Hrs  T(C): 36.7 (12 Nov 2021 09:47), Max: 36.7 (12 Nov 2021 09:47)  T(F): 98 (12 Nov 2021 09:47), Max: 98 (12 Nov 2021 09:47)  HR: 74 (12 Nov 2021 09:47) (71 - 86)  BP: 100/64 (12 Nov 2021 09:47) (100/62 - 114/62)  BP(mean): --  RR: 18 (12 Nov 2021 09:47) (16 - 18)  SpO2: 95% (12 Nov 2021 09:47) (93% - 97%)    General:    NAD,  non toxic, on HFNC  Head: atraumatic, normocephalic  Eye: normal sclera and conjunctiva  ENT:    no oral lesions, neck supple  Cardio:     regular S1, S2,  no murmur  Respiratory:    diffuse coarse BS with crackles b/l,    no wheezing  abd:     soft,   BS +,   no tenderness  :   no CVAT,  no suprapubic tenderness,   no  monzon  Musculoskeletal:   no joint swelling,   no edema  vascular: no central lines, +PIV   Skin:    no rash  Neurologic:     no focal deficit  psych: normal affect    WBC Count: 14.82 K/uL (11-12 @ 07:16)  WBC Count: 7.62 K/uL (11-11 @ 07:01)  WBC Count: 11.76 K/uL (11-10 @ 20:18)                            10.8   14.82 )-----------( 206      ( 12 Nov 2021 07:16 )             33.1       11-12    139  |  108  |  18  ----------------------------<  129<H>  4.2   |  26  |  0.72    Ca    8.1<L>      12 Nov 2021 07:16  Phos  2.6     11-12  Mg     2.2     11-12    TPro  6.7  /  Alb  2.0<L>  /  TBili  0.4  /  DBili  .19  /  AST  81<H>  /  ALT  127<H>  /  AlkPhos  447<H>  11-12          Creatinine Trend: 0.72<--, 0.73<--, 0.85<--, 1.04<--  Lactate, Blood: 2.1 mmol/L (11-10-21 @ 22:06)  Lactate, Blood: 4.5 mmol/L (11-10-21 @ 20:24)      MICROBIOLOGY:  v  .Blood  11-11-21   No growth to date.  --  --      D-Dimer Assay, Quantitative: 69504 (11-10)        RADIOLOGY:

## 2021-11-12 NOTE — PROGRESS NOTE ADULT - ASSESSMENT
72 yo male with history of HLD, hypothyroidism who presents from home after testing positive for COVID on 11/4. Patient went to PCP on 11/4 and got tested, family says they just only got the results today. He has had increased SOB and cough at home. Pleuritic chest pain bilaterally. He did receive the first dose of the moderna vaccine on 10/4 and was due to get the next dose last week before may symptoms. At this time patient feels better on oxygen. He is 92% on 4LPM via NC. CT chest with contrast on admission showed bilateral subsegmental PE and patient started on heparin drip. Medicine called for admission.    #acute hypoxemic resp failure 2/2 COVID 19 pneumonia and sepsis/poa    -continue on dexamethasone ,  remdesivir  - consult ID and pulm   incentive spirometer; instructed on use  - proning encouraged  - OOB to chair when awake  - albuterol PRN  - oxygen to be titrated as allows    #bilateral PE  - continue with heparin IV  - if remains stable can transition to BID lovenox    #hypothyroidism  - continue levothyroxine    #hyperlipidemia  - continue atorvastatin    #DVT ppx  - full dose AC as above

## 2021-11-12 NOTE — PROGRESS NOTE ADULT - SUBJECTIVE AND OBJECTIVE BOX
Patient is a 71y old  Male who presents with a chief complaint of     OVERNIGHT EVENTS:    MEDICATIONS  (STANDING):  atorvastatin 20 milliGRAM(s) Oral at bedtime  dexAMETHasone  Injectable 6 milliGRAM(s) IV Push daily  dextrose 40% Gel 15 Gram(s) Oral once  dextrose 5%. 1000 milliLiter(s) (50 mL/Hr) IV Continuous <Continuous>  dextrose 5%. 1000 milliLiter(s) (100 mL/Hr) IV Continuous <Continuous>  dextrose 50% Injectable 25 Gram(s) IV Push once  dextrose 50% Injectable 12.5 Gram(s) IV Push once  dextrose 50% Injectable 25 Gram(s) IV Push once  glucagon  Injectable 1 milliGRAM(s) IntraMuscular once  heparin  Infusion. 1200 Unit(s)/Hr (12 mL/Hr) IV Continuous <Continuous>  insulin lispro (ADMELOG) corrective regimen sliding scale   SubCutaneous three times a day before meals  insulin lispro (ADMELOG) corrective regimen sliding scale   SubCutaneous at bedtime  levothyroxine 75 MICROGram(s) Oral daily  pantoprazole    Tablet 40 milliGRAM(s) Oral before breakfast  remdesivir  IVPB   IV Intermittent   remdesivir  IVPB 100 milliGRAM(s) IV Intermittent every 24 hours    MEDICATIONS  (PRN):  acetaminophen     Tablet .. 650 milliGRAM(s) Oral every 4 hours PRN Temp greater or equal to 38.5C (101.3F)  ALBUTerol    90 MICROgram(s) HFA Inhaler 2 Puff(s) Inhalation every 6 hours PRN Shortness of Breath  aluminum hydroxide/magnesium hydroxide/simethicone Suspension 30 milliLiter(s) Oral every 8 hours PRN Dyspepsia  heparin   Injectable 6000 Unit(s) IV Push every 6 hours PRN For aPTT less than 40  heparin   Injectable 3000 Unit(s) IV Push every 6 hours PRN For aPTT between 40 - 57    Allergies    No Known Allergies    Intolerances        SUBJECTIVE: in bed in NAD, no acute events overnight     Vital Signs Last 24 Hrs  T(C): 36.7 (12 Nov 2021 09:47), Max: 36.7 (12 Nov 2021 09:47)  T(F): 98 (12 Nov 2021 09:47), Max: 98 (12 Nov 2021 09:47)  HR: 74 (12 Nov 2021 09:47) (71 - 86)  BP: 100/64 (12 Nov 2021 09:47) (100/62 - 110/62)  BP(mean): --  RR: 18 (12 Nov 2021 09:47) (16 - 18)  SpO2: 95% (12 Nov 2021 09:47) (93% - 97%)    PHYSICAL EXAM:  GENERAL: NAD, well-groomed, well-developed  HEAD:  Atraumatic, Normocephalic  EYES: EOMI, PERRLA, conjunctiva and sclera clear  ENMT: No tonsillar erythema, exudates, or enlargement; Moist mucous membranes, Good dentition, No lesions  NECK: Supple, No JVD, Normal thyroid  CHEST/LUNG: Clear to  auscultation bilaterally; No rales, rhonchi, wheezing, or rubs  bilaterally  HEART: Regular rate and rhythm; No murmurs, rubs, or gallops  ABDOMEN: Soft, Nontender, Nondistended; Bowel sounds present  EXTREMITIES:  2+ Peripheral Pulses, No clubbing, cyanosis, or edema BL LE  SKIN: No rashes or lesions  NERVOUS SYSTEM:  Alert & Oriented X3, Good concentration; Motor Strength 5/5 B/L upper and lower extremities;   DTRs 2+ intact and symmetric, sensation intact BL    LABS:                            10.8   14.82 )-----------( 206      ( 12 Nov 2021 07:16 )             33.1   11-12    139  |  108  |  18  ----------------------------<  129<H>  4.2   |  26  |  0.72    Ca    8.1<L>      12 Nov 2021 07:16  Phos  2.6     11-12  Mg     2.2     11-12    TPro  6.7  /  Alb  2.0<L>  /  TBili  0.4  /  DBili  .19  /  AST  81<H>  /  ALT  127<H>  /  AlkPhos  447<H>  11-12    Cultures;   CAPILLARY BLOOD GLUCOSE        Lipid panel:           RADIOLOGY & ADDITIONAL TESTS:  < from: CT Angio Chest PE Protocol w/ IV Cont (11.10.21 @ 22:27) >  IMPRESSION:    Bilateral segmental and subsegmental pulmonary emboli in all lobes. No evidence of right heart strain.    Pulmonary findings typical of Covid 19 pneumonia.    Mild mediastinal adenopathy, likely reactive.    Findings discussed with JORDEN Orellana via phone on 11/10/2021 10:30 PM by Dr. Costa Huynh with read back confirmation.        < end of copied text >      Imaging Personally Reviewed:  [ x] YES      Consultant(s) Notes Reviewed:  [x ] YES     Care Discussed with [x ] Consultants [X ] Patient [x ] Family  [x ]    [x ]  Other; RN Patient is a 71y old  Male who presents with a chief complaint of     OVERNIGHT EVENTS:    MEDICATIONS  (STANDING):  atorvastatin 20 milliGRAM(s) Oral at bedtime  dexAMETHasone  Injectable 6 milliGRAM(s) IV Push daily  dextrose 40% Gel 15 Gram(s) Oral once  dextrose 5%. 1000 milliLiter(s) (50 mL/Hr) IV Continuous <Continuous>  dextrose 5%. 1000 milliLiter(s) (100 mL/Hr) IV Continuous <Continuous>  dextrose 50% Injectable 25 Gram(s) IV Push once  dextrose 50% Injectable 12.5 Gram(s) IV Push once  dextrose 50% Injectable 25 Gram(s) IV Push once  glucagon  Injectable 1 milliGRAM(s) IntraMuscular once  heparin  Infusion. 1200 Unit(s)/Hr (12 mL/Hr) IV Continuous <Continuous>  insulin lispro (ADMELOG) corrective regimen sliding scale   SubCutaneous three times a day before meals  insulin lispro (ADMELOG) corrective regimen sliding scale   SubCutaneous at bedtime  levothyroxine 75 MICROGram(s) Oral daily  pantoprazole    Tablet 40 milliGRAM(s) Oral before breakfast  remdesivir  IVPB   IV Intermittent   remdesivir  IVPB 100 milliGRAM(s) IV Intermittent every 24 hours    MEDICATIONS  (PRN):  acetaminophen     Tablet .. 650 milliGRAM(s) Oral every 4 hours PRN Temp greater or equal to 38.5C (101.3F)  ALBUTerol    90 MICROgram(s) HFA Inhaler 2 Puff(s) Inhalation every 6 hours PRN Shortness of Breath  aluminum hydroxide/magnesium hydroxide/simethicone Suspension 30 milliLiter(s) Oral every 8 hours PRN Dyspepsia  heparin   Injectable 6000 Unit(s) IV Push every 6 hours PRN For aPTT less than 40  heparin   Injectable 3000 Unit(s) IV Push every 6 hours PRN For aPTT between 40 - 57    Allergies    No Known Allergies    Intolerances        SUBJECTIVE: in bed in NAD, no acute events overnight     Vital Signs Last 24 Hrs  T(C): 36.7 (12 Nov 2021 09:47), Max: 36.7 (12 Nov 2021 09:47)  T(F): 98 (12 Nov 2021 09:47), Max: 98 (12 Nov 2021 09:47)  HR: 74 (12 Nov 2021 09:47) (71 - 86)  BP: 100/64 (12 Nov 2021 09:47) (100/62 - 110/62)  BP(mean): --  RR: 18 (12 Nov 2021 09:47) (16 - 18)  SpO2: 95% (12 Nov 2021 09:47) (93% - 97%)    PHYSICAL EXAM:  GENERAL: NAD, well-groomed, well-developed  HEAD:  Atraumatic, Normocephalic  EYES: EOMI, PERRLA, conjunctiva and sclera clear  ENMT: No tonsillar erythema, exudates, or enlargement; Moist mucous membranes, Good dentition, No lesions  NECK: Supple, No JVD, Normal thyroid  CHEST/LUNG: coarse bs more at a bases . no wheezing   HEART: Regular rate and rhythm; No murmurs, rubs, or gallops  ABDOMEN: Soft, Nontender, Nondistended; Bowel sounds present  EXTREMITIES:  2+ Peripheral Pulses, No clubbing, cyanosis, or edema BL LE  SKIN: No rashes or lesions  NERVOUS SYSTEM:  Alert & Oriented X3, Good concentration; Motor Strength 5/5 B/L upper and lower extremities;   DTRs 2+ intact and symmetric, sensation intact BL    LABS:                            10.8   14.82 )-----------( 206      ( 12 Nov 2021 07:16 )             33.1   11-12    139  |  108  |  18  ----------------------------<  129<H>  4.2   |  26  |  0.72    Ca    8.1<L>      12 Nov 2021 07:16  Phos  2.6     11-12  Mg     2.2     11-12    TPro  6.7  /  Alb  2.0<L>  /  TBili  0.4  /  DBili  .19  /  AST  81<H>  /  ALT  127<H>  /  AlkPhos  447<H>  11-12    Cultures;   CAPILLARY BLOOD GLUCOSE        Lipid panel:           RADIOLOGY & ADDITIONAL TESTS:  < from: CT Angio Chest PE Protocol w/ IV Cont (11.10.21 @ 22:27) >  IMPRESSION:    Bilateral segmental and subsegmental pulmonary emboli in all lobes. No evidence of right heart strain.    Pulmonary findings typical of Covid 19 pneumonia.    Mild mediastinal adenopathy, likely reactive.    Findings discussed with JORDEN Orellana via phone on 11/10/2021 10:30 PM by Dr. Costa Huynh with read back confirmation.        < end of copied text >      Imaging Personally Reviewed:  [ x] YES      Consultant(s) Notes Reviewed:  [x ] YES     Care Discussed with [x ] Consultants [X ] Patient [x ] Family  [x ]    [x ]  Other; RN

## 2021-11-12 NOTE — CONSULT NOTE ADULT - SUBJECTIVE AND OBJECTIVE BOX
Patient is a 71y old  Male who presents with a chief complaint of covid  pna (11 Nov 2021 14:22)    HPI:  72 yo male with HLD, Hypothyroidism  and Emphysema per history. Got 1st dose of Moderna Vaccine on 10/04/21.  Tested positive for COVID on 11/04/21.  Came with SOB, Cough and pleuritic chest pain, found to be hypoxic. Testing showed bilateral PE, lung infiltrates.  Admitted with hypoxic Respiratory failure.    PAST MEDICAL & SURGICAL HISTORY:  Hypothyroidism    Hyperlipidemia    Emphysema lung    No significant past surgical history    FAMILY HISTORY:  No pertinent family history in first degree relatives    SOCIAL HISTORY:  Denies smoking    Allergies  No Known Allergies    MEDICATIONS  (STANDING):  atorvastatin 20 milliGRAM(s) Oral at bedtime  dexAMETHasone  Injectable 6 milliGRAM(s) IV Push daily  dextrose 40% Gel 15 Gram(s) Oral once  dextrose 5%. 1000 milliLiter(s) (50 mL/Hr) IV Continuous <Continuous>  dextrose 5%. 1000 milliLiter(s) (100 mL/Hr) IV Continuous <Continuous>  dextrose 50% Injectable 25 Gram(s) IV Push once  dextrose 50% Injectable 12.5 Gram(s) IV Push once  dextrose 50% Injectable 25 Gram(s) IV Push once  glucagon  Injectable 1 milliGRAM(s) IntraMuscular once  heparin  Infusion. 1200 Unit(s)/Hr (12 mL/Hr) IV Continuous <Continuous>  insulin lispro (ADMELOG) corrective regimen sliding scale   SubCutaneous three times a day before meals  insulin lispro (ADMELOG) corrective regimen sliding scale   SubCutaneous at bedtime  levothyroxine 75 MICROGram(s) Oral daily  remdesivir  IVPB   IV Intermittent   remdesivir  IVPB 100 milliGRAM(s) IV Intermittent every 24 hours    MEDICATIONS  (PRN):  acetaminophen     Tablet .. 650 milliGRAM(s) Oral every 4 hours PRN Temp greater or equal to 38.5C (101.3F)  aluminum hydroxide/magnesium hydroxide/simethicone Suspension 30 milliLiter(s) Oral every 8 hours PRN Dyspepsia  heparin   Injectable 6000 Unit(s) IV Push every 6 hours PRN For aPTT less than 40  heparin   Injectable 3000 Unit(s) IV Push every 6 hours PRN For aPTT between 40 - 57    Vital Signs Last 24 Hrs  T(C): 36.7 (12 Nov 2021 09:47), Max: 36.7 (12 Nov 2021 09:47)  T(F): 98 (12 Nov 2021 09:47), Max: 98 (12 Nov 2021 09:47)  HR: 74 (12 Nov 2021 09:47) (71 - 86)  BP: 100/64 (12 Nov 2021 09:47) (100/62 - 110/62)  BP(mean): --  RR: 18 (12 Nov 2021 09:47) (16 - 18)  SpO2: 95% (12 Nov 2021 09:47) (93% - 97%)    PHYSICAL EXAM:  GEN:         Awake, responsive and comfortable.  HEENT:    Normal.    RESP:        no distress  CVS:          Regular rate and rhythm.   ABD:         Soft, non-tender, non-distended;   SKIN:           Warm and dry.  EXTR:            No clubbing, cyanosis or edema  CNS:              Intact sensory and motor function.  PSYCH:        cooperative, no anxiety or depression    LABS:                        10.8   14.82 )-----------( 206      ( 12 Nov 2021 07:16 )             33.1     11-12    139  |  108  |  18  ----------------------------<  129<H>  4.2   |  26  |  0.72    Ca    8.1<L>      12 Nov 2021 07:16  Phos  2.6     11-12  Mg     2.2     11-12    TPro  6.7  /  Alb  2.0<L>  /  TBili  0.4  /  DBili  .19  /  AST  81<H>  /  ALT  127<H>  /  AlkPhos  447<H>  11-12    PT/INR - ( 12 Nov 2021 07:16 )   PT: 15.8 sec;   INR: 1.38 ratio      PTT - ( 12 Nov 2021 07:16 )  PTT:82.4 sec  11-10 @ 22:46  pH: 7.45  pCO2: 34  pO2: 59  SaO2: 89    Culture - Blood (collected 11-11-21 @ 00:30)  Source: .Blood  Preliminary Report (11-12-21 @ 01:02):    No growth to date.    Culture - Blood (collected 11-11-21 @ 00:30)  Source: .Blood  Preliminary Report (11-12-21 @ 01:02):    No growth to date.    EKG: sinus    RADIOLOGY & ADDITIONAL STUDIES:  < from: CT Angio Chest PE Protocol w/ IV Cont (11.10.21 @ 22:27) >  EXAM:  CT ANGIO CHEST PULM ART [facility]                          PROCEDURE DATE:  11/10/2021      INTERPRETATION:  CLINICAL INFORMATION: Multifocal dense and groundglass consolidations with subpleural distribution bilaterally.    COMPARISON: None.    CONTRAST/COMPLICATIONS:  IV Contrast: Omnipaque 350  70 cc administered   30 cc discarded  Oral Contrast: NONE  Complications: None reported at time of study completion    PROCEDURE:  CT Angiography of the Chest.  Sagittal and coronal reformats were performed as well as 3D (MIP) reconstructions.    FINDINGS:    LUNGS AND AIRWAYS: Patent central airways. Diffuse centrilobular emphysema. Multifocal dense and groundglass consolidations in a subpleural location and bibasilar predominance. No suspicious pulmonary nodules.  PLEURA: No pleural effusion.  MEDIASTINUM AND DEEPTI: Mild adenopathy at stations 4R, 5, and 7, with the largest lymph nodes at C7 7 measuring 1.6 cm in short axis.  VESSELS: Segmental and subsegmental pulmonary emboli identified in all lobes. Atherosclerotic calcifications of the aorta and coronary arteries. Fusiform aneurysm of the ascending aorta measuring up to 4.4 cm.  HEART: Heart size is normal. No pericardial effusion.  CHEST WALL AND LOWER NECK: Within normal limits.  VISUALIZED UPPER ABDOMEN: Within normal limits.  BONES: Degenerative changes.    IMPRESSION:    Bilateral segmental and subsegmental pulmonary emboli in all lobes. No evidence of right heart strain.    Pulmonary findings typical of Covid 19 pneumonia.    Mild mediastinal adenopathy, likely reactive.    Findings discussed with JORDEN Orellana via phone on 11/10/2021 10:30 PM by Dr. Costa Huynh with read back confirmation.    COSTA HUYNH DO; Attending Radiologist  This document has been electronically signed. Nov 10 2021 10:54PM    ASSESSMENT AND PLAN:  ·	Acute hypoxic Respiratory failure.  ·	COVID Pneumonia.  ·	Bilateral PE.  ·	Hypothyroidism.  ·	HLD.  ·	Anemia.    SPO2 94% on 40 litre and 45% high flow.  Continue O2, Rendesivir and Dexamethasone.  On full anticoagulation.  Add PRN albuterol.
ARTHUR ZAZUETA  MRN-[id number]        Patient is a 71y old  Male who presents with a chief complaint of covid  pna (11 Nov 2021 14:22)      HPI:  70 yo male with history of HLD, hypothyroidism who presents from home after testing positive for COVID on 11/4. Patient went to PCP on 11/4 and got tested, family says they just only got the results today. He has had increased SOB and cough at home. Pleuritic chest pain bilaterally. He did receive the first dose of the moderna vaccine on 10/4 and was due to get the next dose last week before may symptoms. At this time patient feels better on oxygen. He is 92% on 4LPM via NC. CT chest with contrast on admission showed bilateral subsegmental PE and patient started on heparin drip. Medicine called for admission. (10 Nov 2021 23:57)      ID consulted for workup and antibiotic management     PAST MEDICAL & SURGICAL HISTORY:  Hypothyroidism    Hyperlipidemia    Emphysema lung    No significant past surgical history        Allergies  No Known Allergies        ANTIMICROBIALS:  remdesivir  IVPB        MEDICATIONS  (STANDING):    remdesivir  IVPB   500 mL/Hr IV Intermittent (11-11-21 @ 05:24)        OTHER MEDS: MEDICATIONS  (STANDING):  acetaminophen     Tablet .. 650 every 4 hours PRN  atorvastatin 20 at bedtime  dexAMETHasone  Injectable 6 daily  heparin   Injectable 5500 every 6 hours PRN  heparin   Injectable 2500 every 6 hours PRN  heparin  Infusion.  <Continuous>  levothyroxine 75 daily      SOCIAL HISTORY:     Former smoker quite 20 years ago  denies etoh or drugs    FAMILY HISTORY:  mother had breathing problems      REVIEW OF SYSTEMS  [  ] ROS unobtainable because:    [ X ] All other systems negative except as noted below:	    Constitutional:  [ ] fever [ ] chills  [ ] weight loss  [ ] weakness  Skin:  [ ] rash [ ] phlebitis	  Eyes: [ ] icterus [ ] pain  [ ] discharge	  ENMT: [ ] sore throat  [ ] thrush [ ] ulcers [ ] exudates [x] dysgeusia   Respiratory: [ x] dyspnea [ ] hemoptysis [x ] cough [ ] sputum	  Cardiovascular:  [ ] chest pain [ ] palpitations [ ] edema	  Gastrointestinal:  [ ] nausea [ ] vomiting [ ] diarrhea [ ] constipation [ x] heart burn	  Genitourinary:  [ ] dysuria [ ] frequency [ ] hematuria [ ] discharge [ ] flank pain  [ ] incontinence  Musculoskeletal:  [ ] myalgias [ ] arthralgias [ ] arthritis  [ ] back pain  Neurological:  [ ] headache [ ] seizures  [ ] confusion/altered mental status  Psychiatric:  [ ] anxiety [ ] depression	  Hematology/Lymphatics:  [ ] lymphadenopathy  Endocrine:  [ ] adrenal [ ] thyroid  Allergic/Immunologic:	 [ ] transplant [ ] seasonal    Vital Signs Last 24 Hrs  T(F): 97.4 (11-11-21 @ 13:09), Max: 98.2 (11-10-21 @ 19:40)    Vital Signs Last 24 Hrs  HR: 78 (11-11-21 @ 13:09) (64 - 113)  BP: 114/62 (11-11-21 @ 13:09) (99/59 - 168/73)  RR: 18 (11-11-21 @ 13:09)  SpO2: 93% (11-11-21 @ 13:09) (81% - 97%)  Wt(kg): --    PHYSICAL EXAM:  Constitutional: non-toxic, no distress, on HFNC, able to speak in full sentences    HEAD/EYES: anicteric, no conjunctival injection  ENT:  supple, no thrush  Cardiovascular:   normal S1, S2, no murmur, no edema  Respiratory:  coarse BS bilaterally with diffuse wheezing   GI:  soft, non-tender, normal bowel sounds  :  no monzon, no CVA tenderness  Musculoskeletal:  no synovitis, normal ROM  Neurologic: awake and alert, normal strength, no focal findings  Skin:  no rash, no erythema, no phlebitis  Heme/Onc: no lymphadenopathy   Psychiatric:  awake, alert, appropriate mood          WBC Count: 7.62 K/uL (11-11 @ 07:01)  WBC Count: 11.76 K/uL (11-10 @ 20:18)                            10.7   7.62  )-----------( 180      ( 11 Nov 2021 07:01 )             33.0       11-11    140  |  110<H>  |  12  ----------------------------<  182<H>  4.6   |  24  |  0.73    Ca    8.1<L>      11 Nov 2021 07:01    TPro  6.8  /  Alb  1.9<L>  /  TBili  0.3  /  DBili  .16  /  AST  70<H>  /  ALT  111<H>  /  AlkPhos  402<H>  11-11      Creatinine Trend: 0.73<--, 0.85<--, 1.04<--        MICROBIOLOGY:  Flu With COVID-19 By REID (11.10.21 @ 20:25)    SARS-CoV-2 Result: Detected: EUA/IVD  This Respiratory Panel uses polymerase chain reaction (PCR) to detect for  influenza A; influenza B; and SARS-CoV-2.  This test was validated by [facility] and is in use under the FDA  Emergency Use Authorization (EUA) for clinical labs CLIA-certified to  perform high complexity testing. Test results should be correlated with  clinical presentation, patient history, and epidemiology.    Influenza A Result: NotDetec: EUA/IVD    Influenza B Result: NotDetec: EUA/IVD        D-Dimer Assay, Quantitative: 51961 (11-10)        RADIOLOGY:  < from: CT Angio Chest PE Protocol w/ IV Cont (11.10.21 @ 22:27) >  IMPRESSION:    Bilateral segmental and subsegmental pulmonary emboli in all lobes. No evidence of right heart strain.    Pulmonary findings typical of Covid 19 pneumonia.    Mild mediastinal adenopathy, likely reactive.    < end of copied text >

## 2021-11-13 LAB
ALBUMIN SERPL ELPH-MCNC: 2.1 G/DL — LOW (ref 3.3–5)
ALP SERPL-CCNC: 401 U/L — HIGH (ref 40–120)
ALT FLD-CCNC: 136 U/L — HIGH (ref 12–78)
ANION GAP SERPL CALC-SCNC: 6 MMOL/L — SIGNIFICANT CHANGE UP (ref 5–17)
APTT BLD: 164.6 SEC — CRITICAL HIGH (ref 27.5–35.5)
APTT BLD: 43.6 SEC — HIGH (ref 27.5–35.5)
AST SERPL-CCNC: 67 U/L — HIGH (ref 15–37)
BILIRUB SERPL-MCNC: 0.5 MG/DL — SIGNIFICANT CHANGE UP (ref 0.2–1.2)
BUN SERPL-MCNC: 22 MG/DL — SIGNIFICANT CHANGE UP (ref 7–23)
CALCIUM SERPL-MCNC: 8.2 MG/DL — LOW (ref 8.5–10.1)
CHLORIDE SERPL-SCNC: 107 MMOL/L — SIGNIFICANT CHANGE UP (ref 96–108)
CO2 SERPL-SCNC: 28 MMOL/L — SIGNIFICANT CHANGE UP (ref 22–31)
CREAT SERPL-MCNC: 0.74 MG/DL — SIGNIFICANT CHANGE UP (ref 0.5–1.3)
D DIMER BLD IA.RAPID-MCNC: 3224 NG/ML DDU — HIGH
GLUCOSE BLDC GLUCOMTR-MCNC: 128 MG/DL — HIGH (ref 70–99)
GLUCOSE BLDC GLUCOMTR-MCNC: 135 MG/DL — HIGH (ref 70–99)
GLUCOSE BLDC GLUCOMTR-MCNC: 137 MG/DL — HIGH (ref 70–99)
GLUCOSE BLDC GLUCOMTR-MCNC: 206 MG/DL — HIGH (ref 70–99)
GLUCOSE SERPL-MCNC: 115 MG/DL — HIGH (ref 70–99)
HCT VFR BLD CALC: 34.3 % — LOW (ref 39–50)
HGB BLD-MCNC: 11.4 G/DL — LOW (ref 13–17)
INR BLD: 1.48 RATIO — HIGH (ref 0.88–1.16)
LDH SERPL L TO P-CCNC: 349 U/L — HIGH (ref 50–242)
MAGNESIUM SERPL-MCNC: 2.4 MG/DL — SIGNIFICANT CHANGE UP (ref 1.6–2.6)
MCHC RBC-ENTMCNC: 29.5 PG — SIGNIFICANT CHANGE UP (ref 27–34)
MCHC RBC-ENTMCNC: 33.2 GM/DL — SIGNIFICANT CHANGE UP (ref 32–36)
MCV RBC AUTO: 88.9 FL — SIGNIFICANT CHANGE UP (ref 80–100)
NRBC # BLD: 0 /100 WBCS — SIGNIFICANT CHANGE UP (ref 0–0)
PHOSPHATE SERPL-MCNC: 3.2 MG/DL — SIGNIFICANT CHANGE UP (ref 2.5–4.5)
PLATELET # BLD AUTO: 223 K/UL — SIGNIFICANT CHANGE UP (ref 150–400)
POTASSIUM SERPL-MCNC: 4.2 MMOL/L — SIGNIFICANT CHANGE UP (ref 3.5–5.3)
POTASSIUM SERPL-SCNC: 4.2 MMOL/L — SIGNIFICANT CHANGE UP (ref 3.5–5.3)
PROT SERPL-MCNC: 6.6 GM/DL — SIGNIFICANT CHANGE UP (ref 6–8.3)
PROTHROM AB SERPL-ACNC: 16.9 SEC — HIGH (ref 10.6–13.6)
RBC # BLD: 3.86 M/UL — LOW (ref 4.2–5.8)
RBC # FLD: 13.7 % — SIGNIFICANT CHANGE UP (ref 10.3–14.5)
SODIUM SERPL-SCNC: 141 MMOL/L — SIGNIFICANT CHANGE UP (ref 135–145)
WBC # BLD: 9.93 K/UL — SIGNIFICANT CHANGE UP (ref 3.8–10.5)
WBC # FLD AUTO: 9.93 K/UL — SIGNIFICANT CHANGE UP (ref 3.8–10.5)

## 2021-11-13 PROCEDURE — 99233 SBSQ HOSP IP/OBS HIGH 50: CPT

## 2021-11-13 RX ADMIN — PANTOPRAZOLE SODIUM 40 MILLIGRAM(S): 20 TABLET, DELAYED RELEASE ORAL at 09:58

## 2021-11-13 RX ADMIN — HEPARIN SODIUM 3000 UNIT(S): 5000 INJECTION INTRAVENOUS; SUBCUTANEOUS at 18:21

## 2021-11-13 RX ADMIN — Medication 6 MILLIGRAM(S): at 05:19

## 2021-11-13 RX ADMIN — Medication 4: at 12:47

## 2021-11-13 RX ADMIN — HEPARIN SODIUM 0 UNIT(S)/HR: 5000 INJECTION INTRAVENOUS; SUBCUTANEOUS at 09:08

## 2021-11-13 RX ADMIN — HEPARIN SODIUM 1000 UNIT(S)/HR: 5000 INJECTION INTRAVENOUS; SUBCUTANEOUS at 10:31

## 2021-11-13 RX ADMIN — HEPARIN SODIUM 1200 UNIT(S)/HR: 5000 INJECTION INTRAVENOUS; SUBCUTANEOUS at 18:13

## 2021-11-13 RX ADMIN — Medication 75 MICROGRAM(S): at 05:19

## 2021-11-13 RX ADMIN — ATORVASTATIN CALCIUM 20 MILLIGRAM(S): 80 TABLET, FILM COATED ORAL at 21:33

## 2021-11-13 RX ADMIN — REMDESIVIR 500 MILLIGRAM(S): 5 INJECTION INTRAVENOUS at 05:19

## 2021-11-13 NOTE — PROGRESS NOTE ADULT - SUBJECTIVE AND OBJECTIVE BOX
Patient is a 71y old  Male who presents with a chief complaint of     OVERNIGHT EVENTS:      MEDICATIONS  (STANDING):  atorvastatin 20 milliGRAM(s) Oral at bedtime  dexAMETHasone  Injectable 6 milliGRAM(s) IV Push daily  dextrose 40% Gel 15 Gram(s) Oral once  dextrose 5%. 1000 milliLiter(s) (100 mL/Hr) IV Continuous <Continuous>  dextrose 5%. 1000 milliLiter(s) (50 mL/Hr) IV Continuous <Continuous>  dextrose 50% Injectable 25 Gram(s) IV Push once  dextrose 50% Injectable 12.5 Gram(s) IV Push once  dextrose 50% Injectable 25 Gram(s) IV Push once  glucagon  Injectable 1 milliGRAM(s) IntraMuscular once  heparin  Infusion. 1200 Unit(s)/Hr (12 mL/Hr) IV Continuous <Continuous>  insulin lispro (ADMELOG) corrective regimen sliding scale   SubCutaneous three times a day before meals  insulin lispro (ADMELOG) corrective regimen sliding scale   SubCutaneous at bedtime  levothyroxine 75 MICROGram(s) Oral daily  pantoprazole    Tablet 40 milliGRAM(s) Oral before breakfast  remdesivir  IVPB 100 milliGRAM(s) IV Intermittent every 24 hours  remdesivir  IVPB   IV Intermittent     MEDICATIONS  (PRN):  acetaminophen     Tablet .. 650 milliGRAM(s) Oral every 4 hours PRN Temp greater or equal to 38.5C (101.3F)  ALBUTerol    90 MICROgram(s) HFA Inhaler 2 Puff(s) Inhalation every 6 hours PRN Shortness of Breath  aluminum hydroxide/magnesium hydroxide/simethicone Suspension 30 milliLiter(s) Oral every 8 hours PRN Dyspepsia  heparin   Injectable 6000 Unit(s) IV Push every 6 hours PRN For aPTT less than 40  heparin   Injectable 3000 Unit(s) IV Push every 6 hours PRN For aPTT between 40 - 57    Allergies    No Known Allergies    Intolerances        SUBJECTIVE: in bed in NAD, no acute events overnight       Vital Signs Last 24 Hrs  T(C): 36.4 (13 Nov 2021 10:50), Max: 36.6 (12 Nov 2021 23:43)  T(F): 97.5 (13 Nov 2021 10:50), Max: 97.9 (12 Nov 2021 23:43)  HR: 60 (13 Nov 2021 10:50) (52 - 75)  BP: 99/54 (13 Nov 2021 10:50) (98/62 - 103/63)  BP(mean): --  RR: 18 (13 Nov 2021 10:50) (16 - 20)  SpO2: 97% (13 Nov 2021 10:50) (91% - 97%)    PHYSICAL EXAM:  GENERAL: NAD, well-groomed, well-developed  HEAD:  Atraumatic, Normocephalic  EYES: EOMI, PERRLA, conjunctiva and sclera clear  ENMT: No tonsillar erythema, exudates, or enlargement; Moist mucous membranes, Good dentition, No lesions  NECK: Supple, No JVD, Normal thyroid  CHEST/LUNG: coarse bs more at a bases . no wheezing   HEART: Regular rate and rhythm; No murmurs, rubs, or gallops  ABDOMEN: Soft, Nontender, Nondistended; Bowel sounds present  EXTREMITIES:  2+ Peripheral Pulses, No clubbing, cyanosis, or edema BL LE  SKIN: No rashes or lesions  NERVOUS SYSTEM:  Alert & Oriented X3, Good concentration; Motor Strength 5/5 B/L upper and lower extremities;   DTRs 2+ intact and symmetric, sensation intact BL    LABS:                          11.4   9.93  )-----------( 223      ( 13 Nov 2021 07:20 )             34.3   11-13    141  |  107  |  22  ----------------------------<  115<H>  4.2   |  28  |  0.74    Ca    8.2<L>      13 Nov 2021 07:20  Phos  3.2     11-13  Mg     2.4     11-13    TPro  6.6  /  Alb  2.1<L>  /  TBili  0.5  /  DBili  x   /  AST  67<H>  /  ALT  136<H>  /  AlkPhos  401<H>  11-13               Cultures;   CAPILLARY BLOOD GLUCOSE        Lipid panel:           RADIOLOGY & ADDITIONAL TESTS:  < from: CT Angio Chest PE Protocol w/ IV Cont (11.10.21 @ 22:27) >  IMPRESSION:    Bilateral segmental and subsegmental pulmonary emboli in all lobes. No evidence of right heart strain.    Pulmonary findings typical of Covid 19 pneumonia.    Mild mediastinal adenopathy, likely reactive.    Findings discussed with JORDEN Orellana via phone on 11/10/2021 10:30 PM by Dr. Costa Huynh with read back confirmation.        < end of copied text >      Imaging Personally Reviewed:  [ x] YES      Consultant(s) Notes Reviewed:  [x ] YES     Care Discussed with [x ] Consultants [X ] Patient [x ] Family  [x ]    [x ]  Other; RN Patient is a 71y old  Male who presents with a chief complaint of     Nurse Lara translated  OVERNIGHT EVENTS:      MEDICATIONS  (STANDING):  atorvastatin 20 milliGRAM(s) Oral at bedtime  dexAMETHasone  Injectable 6 milliGRAM(s) IV Push daily  dextrose 40% Gel 15 Gram(s) Oral once  dextrose 5%. 1000 milliLiter(s) (100 mL/Hr) IV Continuous <Continuous>  dextrose 5%. 1000 milliLiter(s) (50 mL/Hr) IV Continuous <Continuous>  dextrose 50% Injectable 25 Gram(s) IV Push once  dextrose 50% Injectable 12.5 Gram(s) IV Push once  dextrose 50% Injectable 25 Gram(s) IV Push once  glucagon  Injectable 1 milliGRAM(s) IntraMuscular once  heparin  Infusion. 1200 Unit(s)/Hr (12 mL/Hr) IV Continuous <Continuous>  insulin lispro (ADMELOG) corrective regimen sliding scale   SubCutaneous three times a day before meals  insulin lispro (ADMELOG) corrective regimen sliding scale   SubCutaneous at bedtime  levothyroxine 75 MICROGram(s) Oral daily  pantoprazole    Tablet 40 milliGRAM(s) Oral before breakfast  remdesivir  IVPB 100 milliGRAM(s) IV Intermittent every 24 hours  remdesivir  IVPB   IV Intermittent     MEDICATIONS  (PRN):  acetaminophen     Tablet .. 650 milliGRAM(s) Oral every 4 hours PRN Temp greater or equal to 38.5C (101.3F)  ALBUTerol    90 MICROgram(s) HFA Inhaler 2 Puff(s) Inhalation every 6 hours PRN Shortness of Breath  aluminum hydroxide/magnesium hydroxide/simethicone Suspension 30 milliLiter(s) Oral every 8 hours PRN Dyspepsia  heparin   Injectable 6000 Unit(s) IV Push every 6 hours PRN For aPTT less than 40  heparin   Injectable 3000 Unit(s) IV Push every 6 hours PRN For aPTT between 40 - 57    Allergies    No Known Allergies    Intolerances        SUBJECTIVE: in bed in NAD, no acute events overnight       Vital Signs Last 24 Hrs  T(C): 36.4 (13 Nov 2021 10:50), Max: 36.6 (12 Nov 2021 23:43)  T(F): 97.5 (13 Nov 2021 10:50), Max: 97.9 (12 Nov 2021 23:43)  HR: 60 (13 Nov 2021 10:50) (52 - 75)  BP: 99/54 (13 Nov 2021 10:50) (98/62 - 103/63)  BP(mean): --  RR: 18 (13 Nov 2021 10:50) (16 - 20)  SpO2: 97% (13 Nov 2021 10:50) (91% - 97%)    PHYSICAL EXAM:  GENERAL: NAD, well-groomed, well-developed  HEAD:  Atraumatic, Normocephalic  EYES: EOMI, PERRLA, conjunctiva and sclera clear  ENMT: No tonsillar erythema, exudates, or enlargement; Moist mucous membranes, Good dentition, No lesions  NECK: Supple, No JVD, Normal thyroid  CHEST/LUNG: coarse bs more at a bases . no wheezing   HEART: Regular rate and rhythm; No murmurs, rubs, or gallops  ABDOMEN: Soft, Nontender, Nondistended; Bowel sounds present  EXTREMITIES:  2+ Peripheral Pulses, No clubbing, cyanosis, or edema BL LE  SKIN: No rashes or lesions  NERVOUS SYSTEM:  Alert & Oriented X3, Good concentration; Motor Strength 5/5 B/L upper and lower extremities;   DTRs 2+ intact and symmetric, sensation intact BL    LABS:                          11.4   9.93  )-----------( 223      ( 13 Nov 2021 07:20 )             34.3   11-13    141  |  107  |  22  ----------------------------<  115<H>  4.2   |  28  |  0.74    Ca    8.2<L>      13 Nov 2021 07:20  Phos  3.2     11-13  Mg     2.4     11-13    TPro  6.6  /  Alb  2.1<L>  /  TBili  0.5  /  DBili  x   /  AST  67<H>  /  ALT  136<H>  /  AlkPhos  401<H>  11-13               Cultures;   CAPILLARY BLOOD GLUCOSE        Lipid panel:           RADIOLOGY & ADDITIONAL TESTS:  < from: CT Angio Chest PE Protocol w/ IV Cont (11.10.21 @ 22:27) >  IMPRESSION:    Bilateral segmental and subsegmental pulmonary emboli in all lobes. No evidence of right heart strain.    Pulmonary findings typical of Covid 19 pneumonia.    Mild mediastinal adenopathy, likely reactive.    Findings discussed with JORDEN Orellana via phone on 11/10/2021 10:30 PM by Dr. Costa Huynh with read back confirmation.        < end of copied text >      Imaging Personally Reviewed:  [ x] YES      Consultant(s) Notes Reviewed:  [x ] YES     Care Discussed with [x ] Consultants [X ] Patient [x ] Family  [x ]    [x ]  Other; RN

## 2021-11-13 NOTE — PROGRESS NOTE ADULT - SUBJECTIVE AND OBJECTIVE BOX
INTERVAL HPI:  72 yo male with HLD, Hypothyroidism  and Emphysema per history. Got 1st dose of Moderna Vaccine on 10/04/21.  Tested positive for COVID on 11/04/21.  Came with SOB, Cough and pleuritic chest pain, found to be hypoxic. Testing showed bilateral PE, lung infiltrates.  Admitted with hypoxic Respiratory failure.    OVERNIGHT EVENTS:  Remains on high flow    Vital Signs Last 24 Hrs  T(C): 36.4 (13 Nov 2021 10:50), Max: 36.6 (12 Nov 2021 23:43)  T(F): 97.5 (13 Nov 2021 10:50), Max: 97.9 (12 Nov 2021 23:43)  HR: 60 (13 Nov 2021 10:50) (52 - 75)  BP: 99/54 (13 Nov 2021 10:50) (98/62 - 103/63)  BP(mean): --  RR: 18 (13 Nov 2021 10:50) (16 - 20)  SpO2: 97% (13 Nov 2021 10:50) (91% - 97%)    PHYSICAL EXAM:  GEN:         Awake, responsive and comfortable.  HEENT:    Normal.    RESP:       no distress  CVS:          Regular rate and rhythm.     MEDICATIONS  (STANDING):  atorvastatin 20 milliGRAM(s) Oral at bedtime  dexAMETHasone  Injectable 6 milliGRAM(s) IV Push daily  dextrose 40% Gel 15 Gram(s) Oral once  dextrose 5%. 1000 milliLiter(s) (100 mL/Hr) IV Continuous <Continuous>  dextrose 5%. 1000 milliLiter(s) (50 mL/Hr) IV Continuous <Continuous>  dextrose 50% Injectable 25 Gram(s) IV Push once  dextrose 50% Injectable 12.5 Gram(s) IV Push once  dextrose 50% Injectable 25 Gram(s) IV Push once  glucagon  Injectable 1 milliGRAM(s) IntraMuscular once  heparin  Infusion. 1200 Unit(s)/Hr (12 mL/Hr) IV Continuous <Continuous>  insulin lispro (ADMELOG) corrective regimen sliding scale   SubCutaneous three times a day before meals  insulin lispro (ADMELOG) corrective regimen sliding scale   SubCutaneous at bedtime  levothyroxine 75 MICROGram(s) Oral daily  pantoprazole    Tablet 40 milliGRAM(s) Oral before breakfast  remdesivir  IVPB 100 milliGRAM(s) IV Intermittent every 24 hours  remdesivir  IVPB   IV Intermittent     MEDICATIONS  (PRN):  acetaminophen     Tablet .. 650 milliGRAM(s) Oral every 4 hours PRN Temp greater or equal to 38.5C (101.3F)  ALBUTerol    90 MICROgram(s) HFA Inhaler 2 Puff(s) Inhalation every 6 hours PRN Shortness of Breath  aluminum hydroxide/magnesium hydroxide/simethicone Suspension 30 milliLiter(s) Oral every 8 hours PRN Dyspepsia  heparin   Injectable 6000 Unit(s) IV Push every 6 hours PRN For aPTT less than 40  heparin   Injectable 3000 Unit(s) IV Push every 6 hours PRN For aPTT between 40 - 57    LABS:                        11.4   9.93  )-----------( 223      ( 13 Nov 2021 07:20 )             34.3     11-13    141  |  107  |  22  ----------------------------<  115<H>  4.2   |  28  |  0.74    Ca    8.2<L>      13 Nov 2021 07:20  Phos  3.2     11-13  Mg     2.4     11-13    TPro  6.6  /  Alb  2.1<L>  /  TBili  0.5  /  DBili  x   /  AST  67<H>  /  ALT  136<H>  /  AlkPhos  401<H>  11-13    PT/INR - ( 13 Nov 2021 07:20 )   PT: 16.9 sec;   INR: 1.48 ratio      PTT - ( 13 Nov 2021 07:20 )  PTT:164.6 sec  11-10 @ 22:46  pH: 7.45  pCO2: 34  pO2: 59  SaO2: 89  ASSESSMENT AND PLAN:  ·	Acute hypoxic Respiratory failure.  ·	COVID Pneumonia.  ·	Bilateral PE.  ·	Hypothyroidism.  ·	HLD.  ·	Anemia.    Try to titrate down high flow as tolerated..  Continue O2, Rendesivir and Dexamethasone.  On full anticoagulation.  Albuterol as needed.

## 2021-11-13 NOTE — PROGRESS NOTE ADULT - ASSESSMENT
70 yo male with history of HLD, hypothyroidism who presents from home after testing positive for COVID on 11/4. Patient went to PCP on 11/4 and got tested, family says they just only got the results today. He has had increased SOB and cough at home. Pleuritic chest pain bilaterally. He did receive the first dose of the moderna vaccine on 10/4 and was due to get the next dose last week before may symptoms. At this time patient feels better on oxygen. He is 92% on 4LPM via NC. CT chest with contrast on admission showed bilateral subsegmental PE and patient started on heparin drip. Medicine called for admission.    #acute hypoxemic resp failure 2/2 COVID 19 pneumonia and sepsis/poa    -continue on dexamethasone ,  remdesivir  - consult ID and pulm   incentive spirometer; instructed on use  - proning encouraged  - OOB to chair when awake  - albuterol PRN  - oxygen to be titrated as allows    #bilateral PE  - continue with heparin IV  - if remains stable can transition to BID lovenox    #hypothyroidism  - continue levothyroxine    #hyperlipidemia  - continue atorvastatin    #DVT ppx  - full dose AC as above

## 2021-11-14 LAB
ALBUMIN SERPL ELPH-MCNC: 2.1 G/DL — LOW (ref 3.3–5)
ALBUMIN SERPL ELPH-MCNC: 2.1 G/DL — LOW (ref 3.3–5)
ALP SERPL-CCNC: 394 U/L — HIGH (ref 40–120)
ALP SERPL-CCNC: 394 U/L — HIGH (ref 40–120)
ALT FLD-CCNC: 198 U/L — HIGH (ref 12–78)
ALT FLD-CCNC: 198 U/L — HIGH (ref 12–78)
ANION GAP SERPL CALC-SCNC: 5 MMOL/L — SIGNIFICANT CHANGE UP (ref 5–17)
APTT BLD: 106.6 SEC — HIGH (ref 27.5–35.5)
APTT BLD: 70 SEC — HIGH (ref 27.5–35.5)
APTT BLD: 97.6 SEC — HIGH (ref 27.5–35.5)
AST SERPL-CCNC: 101 U/L — HIGH (ref 15–37)
AST SERPL-CCNC: 101 U/L — HIGH (ref 15–37)
BILIRUB DIRECT SERPL-MCNC: 0.19 MG/DL — SIGNIFICANT CHANGE UP (ref 0.05–0.2)
BILIRUB INDIRECT FLD-MCNC: 0.2 MG/DL — SIGNIFICANT CHANGE UP (ref 0.2–1)
BILIRUB SERPL-MCNC: 0.4 MG/DL — SIGNIFICANT CHANGE UP (ref 0.2–1.2)
BILIRUB SERPL-MCNC: 0.4 MG/DL — SIGNIFICANT CHANGE UP (ref 0.2–1.2)
BUN SERPL-MCNC: 25 MG/DL — HIGH (ref 7–23)
CALCIUM SERPL-MCNC: 8.4 MG/DL — LOW (ref 8.5–10.1)
CHLORIDE SERPL-SCNC: 109 MMOL/L — HIGH (ref 96–108)
CO2 SERPL-SCNC: 27 MMOL/L — SIGNIFICANT CHANGE UP (ref 22–31)
CREAT SERPL-MCNC: 0.81 MG/DL — SIGNIFICANT CHANGE UP (ref 0.5–1.3)
CREAT SERPL-MCNC: 0.81 MG/DL — SIGNIFICANT CHANGE UP (ref 0.5–1.3)
GLUCOSE BLDC GLUCOMTR-MCNC: 123 MG/DL — HIGH (ref 70–99)
GLUCOSE BLDC GLUCOMTR-MCNC: 129 MG/DL — HIGH (ref 70–99)
GLUCOSE BLDC GLUCOMTR-MCNC: 137 MG/DL — HIGH (ref 70–99)
GLUCOSE BLDC GLUCOMTR-MCNC: 204 MG/DL — HIGH (ref 70–99)
GLUCOSE SERPL-MCNC: 112 MG/DL — HIGH (ref 70–99)
HCT VFR BLD CALC: 36.5 % — LOW (ref 39–50)
HGB BLD-MCNC: 12.1 G/DL — LOW (ref 13–17)
INR BLD: 1.54 RATIO — HIGH (ref 0.88–1.16)
MCHC RBC-ENTMCNC: 29.4 PG — SIGNIFICANT CHANGE UP (ref 27–34)
MCHC RBC-ENTMCNC: 33.2 GM/DL — SIGNIFICANT CHANGE UP (ref 32–36)
MCV RBC AUTO: 88.6 FL — SIGNIFICANT CHANGE UP (ref 80–100)
NRBC # BLD: 0 /100 WBCS — SIGNIFICANT CHANGE UP (ref 0–0)
PLATELET # BLD AUTO: 258 K/UL — SIGNIFICANT CHANGE UP (ref 150–400)
POTASSIUM SERPL-MCNC: 4.4 MMOL/L — SIGNIFICANT CHANGE UP (ref 3.5–5.3)
POTASSIUM SERPL-SCNC: 4.4 MMOL/L — SIGNIFICANT CHANGE UP (ref 3.5–5.3)
PROT SERPL-MCNC: 6.8 GM/DL — SIGNIFICANT CHANGE UP (ref 6–8.3)
PROT SERPL-MCNC: 6.8 GM/DL — SIGNIFICANT CHANGE UP (ref 6–8.3)
PROTHROM AB SERPL-ACNC: 17.5 SEC — HIGH (ref 10.6–13.6)
RBC # BLD: 4.12 M/UL — LOW (ref 4.2–5.8)
RBC # FLD: 13.3 % — SIGNIFICANT CHANGE UP (ref 10.3–14.5)
SODIUM SERPL-SCNC: 141 MMOL/L — SIGNIFICANT CHANGE UP (ref 135–145)
WBC # BLD: 11.93 K/UL — HIGH (ref 3.8–10.5)
WBC # FLD AUTO: 11.93 K/UL — HIGH (ref 3.8–10.5)

## 2021-11-14 PROCEDURE — 99232 SBSQ HOSP IP/OBS MODERATE 35: CPT

## 2021-11-14 RX ORDER — POLYETHYLENE GLYCOL 3350 17 G/17G
17 POWDER, FOR SOLUTION ORAL DAILY
Refills: 0 | Status: DISCONTINUED | OUTPATIENT
Start: 2021-11-14 | End: 2021-11-20

## 2021-11-14 RX ORDER — SENNA PLUS 8.6 MG/1
2 TABLET ORAL AT BEDTIME
Refills: 0 | Status: DISCONTINUED | OUTPATIENT
Start: 2021-11-14 | End: 2021-11-20

## 2021-11-14 RX ADMIN — POLYETHYLENE GLYCOL 3350 17 GRAM(S): 17 POWDER, FOR SOLUTION ORAL at 18:33

## 2021-11-14 RX ADMIN — Medication 75 MICROGRAM(S): at 05:35

## 2021-11-14 RX ADMIN — REMDESIVIR 500 MILLIGRAM(S): 5 INJECTION INTRAVENOUS at 05:34

## 2021-11-14 RX ADMIN — Medication 6 MILLIGRAM(S): at 05:34

## 2021-11-14 RX ADMIN — HEPARIN SODIUM 1000 UNIT(S)/HR: 5000 INJECTION INTRAVENOUS; SUBCUTANEOUS at 20:48

## 2021-11-14 RX ADMIN — HEPARIN SODIUM 1000 UNIT(S)/HR: 5000 INJECTION INTRAVENOUS; SUBCUTANEOUS at 12:05

## 2021-11-14 RX ADMIN — ATORVASTATIN CALCIUM 20 MILLIGRAM(S): 80 TABLET, FILM COATED ORAL at 21:59

## 2021-11-14 RX ADMIN — PANTOPRAZOLE SODIUM 40 MILLIGRAM(S): 20 TABLET, DELAYED RELEASE ORAL at 06:06

## 2021-11-14 RX ADMIN — SENNA PLUS 2 TABLET(S): 8.6 TABLET ORAL at 21:59

## 2021-11-14 RX ADMIN — HEPARIN SODIUM 1000 UNIT(S)/HR: 5000 INJECTION INTRAVENOUS; SUBCUTANEOUS at 02:18

## 2021-11-14 NOTE — PROGRESS NOTE ADULT - SUBJECTIVE AND OBJECTIVE BOX
Patient is a 71y old  Male who presents with a chief complaint of     OVERNIGHT EVENTS:  none      MEDICATIONS  (STANDING):  atorvastatin 20 milliGRAM(s) Oral at bedtime  dexAMETHasone  Injectable 6 milliGRAM(s) IV Push daily  dextrose 40% Gel 15 Gram(s) Oral once  dextrose 5%. 1000 milliLiter(s) (50 mL/Hr) IV Continuous <Continuous>  dextrose 5%. 1000 milliLiter(s) (100 mL/Hr) IV Continuous <Continuous>  dextrose 50% Injectable 25 Gram(s) IV Push once  dextrose 50% Injectable 12.5 Gram(s) IV Push once  dextrose 50% Injectable 25 Gram(s) IV Push once  glucagon  Injectable 1 milliGRAM(s) IntraMuscular once  heparin  Infusion. 1200 Unit(s)/Hr (12 mL/Hr) IV Continuous <Continuous>  insulin lispro (ADMELOG) corrective regimen sliding scale   SubCutaneous three times a day before meals  insulin lispro (ADMELOG) corrective regimen sliding scale   SubCutaneous at bedtime  levothyroxine 75 MICROGram(s) Oral daily  pantoprazole    Tablet 40 milliGRAM(s) Oral before breakfast  remdesivir  IVPB 100 milliGRAM(s) IV Intermittent every 24 hours  remdesivir  IVPB   IV Intermittent     MEDICATIONS  (PRN):  acetaminophen     Tablet .. 650 milliGRAM(s) Oral every 4 hours PRN Temp greater or equal to 38.5C (101.3F)  ALBUTerol    90 MICROgram(s) HFA Inhaler 2 Puff(s) Inhalation every 6 hours PRN Shortness of Breath  aluminum hydroxide/magnesium hydroxide/simethicone Suspension 30 milliLiter(s) Oral every 8 hours PRN Dyspepsia  guaiFENesin Oral Liquid (Sugar-Free) 200 milliGRAM(s) Oral every 6 hours PRN Cough  heparin   Injectable 6000 Unit(s) IV Push every 6 hours PRN For aPTT less than 40  heparin   Injectable 3000 Unit(s) IV Push every 6 hours PRN For aPTT between 40 - 57    Allergies    No Known Allergies    Intolerances        SUBJECTIVE: in bed in NAD, no acute events overnight     Vital Signs Last 24 Hrs  T(C): 36.6 (14 Nov 2021 04:52), Max: 36.6 (14 Nov 2021 04:52)  T(F): 97.8 (14 Nov 2021 04:52), Max: 97.8 (14 Nov 2021 04:52)  HR: 53 (14 Nov 2021 08:47) (53 - 68)  BP: 92/52 (14 Nov 2021 04:52) (92/52 - 106/67)  BP(mean): --  RR: 18 (14 Nov 2021 08:47) (18 - 18)  SpO2: 99% (14 Nov 2021 08:47) (94% - 99%)    PHYSICAL EXAM:  GENERAL: NAD, well-groomed, well-developed  HEAD:  Atraumatic, Normocephalic  EYES: EOMI, PERRLA, conjunctiva and sclera clear  ENMT: No tonsillar erythema, exudates, or enlargement; Moist mucous membranes, Good dentition, No lesions  NECK: Supple, No JVD, Normal thyroid  CHEST/LUNG: coarse bs more at a bases . no wheezing   HEART: Regular rate and rhythm; No murmurs, rubs, or gallops  ABDOMEN: Soft, Nontender, Nondistended; Bowel sounds present  EXTREMITIES:  2+ Peripheral Pulses, No clubbing, cyanosis, or edema BL LE  SKIN: No rashes or lesions  NERVOUS SYSTEM:  Alert & Oriented X3, Good concentration; Motor Strength 5/5 B/L upper and lower extremities;   DTRs 2+ intact and symmetric, sensation intact BL    LABS:                                   12.1   11.93 )-----------( 258      ( 14 Nov 2021 06:36 )             36.5   11-14    141  |  109<H>  |  25<H>  ----------------------------<  112<H>  4.4   |  27  |  0.81    Ca    8.4<L>      14 Nov 2021 06:36  Phos  3.2     11-13  Mg     2.4     11-13    TPro  6.8  /  Alb  2.1<L>  /  TBili  0.4  /  DBili  .19  /  AST  101<H>  /  ALT  198<H>  /  AlkPhos  394<H>  11-14               Cultures;   CAPILLARY BLOOD GLUCOSE        Lipid panel:           RADIOLOGY & ADDITIONAL TESTS:  < from: CT Angio Chest PE Protocol w/ IV Cont (11.10.21 @ 22:27) >  IMPRESSION:    Bilateral segmental and subsegmental pulmonary emboli in all lobes. No evidence of right heart strain.    Pulmonary findings typical of Covid 19 pneumonia.    Mild mediastinal adenopathy, likely reactive.    Findings discussed with JORDEN Orellana via phone on 11/10/2021 10:30 PM by Dr. Costa Huynh with read back confirmation.        < end of copied text >      Imaging Personally Reviewed:  [ x] YES      Consultant(s) Notes Reviewed:  [x ] YES     Care Discussed with [x ] Consultants [X ] Patient [x ] Family  [x ]    [x ]  Other; RN

## 2021-11-14 NOTE — PROGRESS NOTE ADULT - ASSESSMENT
72 yo male with history of HLD, hypothyroidism who presents from home after testing positive for COVID on 11/4. Patient went to PCP on 11/4 and got tested, family says they just only got the results today. He has had increased SOB and cough at home. Pleuritic chest pain bilaterally. He did receive the first dose of the moderna vaccine on 10/4 and was due to get the next dose last week before may symptoms. At this time patient feels better on oxygen. He is 92% on 4LPM via NC. CT chest with contrast on admission showed bilateral subsegmental PE and patient started on heparin drip. Medicine called for admission.    #acute hypoxemic resp failure 2/2 COVID 19 pneumonia and sepsis/poa    -continue on dexamethasone ,  remdesivir  - consult ID and pulm   incentive spirometer; instructed on use  - proning encouraged  - OOB to chair when awake  - albuterol PRN  - oxygen to be titrated as allows  11/14/2021 currently on fio2 55% 50lpm will attempt to titrate down    encourage prone and incentive spirometry    #bilateral PE  - continue with heparin IV  - if remains stable can transition to BID lovenox    #hypothyroidism  - continue levothyroxine    #hyperlipidemia  - continue atorvastatin    #DVT ppx  - full dose AC as above

## 2021-11-15 LAB
ALBUMIN SERPL ELPH-MCNC: 2.1 G/DL — LOW (ref 3.3–5)
ALP SERPL-CCNC: 369 U/L — HIGH (ref 40–120)
ALT FLD-CCNC: 172 U/L — HIGH (ref 12–78)
ANION GAP SERPL CALC-SCNC: 5 MMOL/L — SIGNIFICANT CHANGE UP (ref 5–17)
APTT BLD: 121.9 SEC — CRITICAL HIGH (ref 27.5–35.5)
APTT BLD: 30.7 SEC — SIGNIFICANT CHANGE UP (ref 27.5–35.5)
APTT BLD: >200 SEC — CRITICAL HIGH (ref 27.5–35.5)
AST SERPL-CCNC: 65 U/L — HIGH (ref 15–37)
BILIRUB DIRECT SERPL-MCNC: 0.21 MG/DL — HIGH (ref 0.05–0.2)
BILIRUB SERPL-MCNC: 0.5 MG/DL — SIGNIFICANT CHANGE UP (ref 0.2–1.2)
BUN SERPL-MCNC: 23 MG/DL — SIGNIFICANT CHANGE UP (ref 7–23)
CALCIUM SERPL-MCNC: 8.1 MG/DL — LOW (ref 8.5–10.1)
CHLORIDE SERPL-SCNC: 106 MMOL/L — SIGNIFICANT CHANGE UP (ref 96–108)
CO2 SERPL-SCNC: 28 MMOL/L — SIGNIFICANT CHANGE UP (ref 22–31)
CREAT SERPL-MCNC: 0.88 MG/DL — SIGNIFICANT CHANGE UP (ref 0.5–1.3)
GLUCOSE BLDC GLUCOMTR-MCNC: 154 MG/DL — HIGH (ref 70–99)
GLUCOSE BLDC GLUCOMTR-MCNC: 171 MG/DL — HIGH (ref 70–99)
GLUCOSE BLDC GLUCOMTR-MCNC: 175 MG/DL — HIGH (ref 70–99)
GLUCOSE BLDC GLUCOMTR-MCNC: 205 MG/DL — HIGH (ref 70–99)
GLUCOSE SERPL-MCNC: 132 MG/DL — HIGH (ref 70–99)
HCT VFR BLD CALC: 36.8 % — LOW (ref 39–50)
HGB BLD-MCNC: 12.2 G/DL — LOW (ref 13–17)
INR BLD: 1.54 RATIO — HIGH (ref 0.88–1.16)
MCHC RBC-ENTMCNC: 29.3 PG — SIGNIFICANT CHANGE UP (ref 27–34)
MCHC RBC-ENTMCNC: 33.2 GM/DL — SIGNIFICANT CHANGE UP (ref 32–36)
MCV RBC AUTO: 88.2 FL — SIGNIFICANT CHANGE UP (ref 80–100)
NRBC # BLD: 0 /100 WBCS — SIGNIFICANT CHANGE UP (ref 0–0)
PLATELET # BLD AUTO: 262 K/UL — SIGNIFICANT CHANGE UP (ref 150–400)
POTASSIUM SERPL-MCNC: 4.2 MMOL/L — SIGNIFICANT CHANGE UP (ref 3.5–5.3)
POTASSIUM SERPL-SCNC: 4.2 MMOL/L — SIGNIFICANT CHANGE UP (ref 3.5–5.3)
PROT SERPL-MCNC: 6.7 GM/DL — SIGNIFICANT CHANGE UP (ref 6–8.3)
PROTHROM AB SERPL-ACNC: 17.5 SEC — HIGH (ref 10.6–13.6)
RBC # BLD: 4.17 M/UL — LOW (ref 4.2–5.8)
RBC # FLD: 13.6 % — SIGNIFICANT CHANGE UP (ref 10.3–14.5)
SODIUM SERPL-SCNC: 139 MMOL/L — SIGNIFICANT CHANGE UP (ref 135–145)
WBC # BLD: 9.72 K/UL — SIGNIFICANT CHANGE UP (ref 3.8–10.5)
WBC # FLD AUTO: 9.72 K/UL — SIGNIFICANT CHANGE UP (ref 3.8–10.5)

## 2021-11-15 PROCEDURE — 99232 SBSQ HOSP IP/OBS MODERATE 35: CPT

## 2021-11-15 RX ORDER — ENOXAPARIN SODIUM 100 MG/ML
70 INJECTION SUBCUTANEOUS ONCE
Refills: 0 | Status: COMPLETED | OUTPATIENT
Start: 2021-11-15 | End: 2021-11-15

## 2021-11-15 RX ORDER — ENOXAPARIN SODIUM 100 MG/ML
70 INJECTION SUBCUTANEOUS
Refills: 0 | Status: DISCONTINUED | OUTPATIENT
Start: 2021-11-16 | End: 2021-11-18

## 2021-11-15 RX ORDER — ENOXAPARIN SODIUM 100 MG/ML
70 INJECTION SUBCUTANEOUS EVERY 12 HOURS
Refills: 0 | Status: DISCONTINUED | OUTPATIENT
Start: 2021-11-15 | End: 2021-11-15

## 2021-11-15 RX ORDER — DEXAMETHASONE 0.5 MG/5ML
6 ELIXIR ORAL DAILY
Refills: 0 | Status: COMPLETED | OUTPATIENT
Start: 2021-11-16 | End: 2021-11-19

## 2021-11-15 RX ADMIN — PANTOPRAZOLE SODIUM 40 MILLIGRAM(S): 20 TABLET, DELAYED RELEASE ORAL at 05:24

## 2021-11-15 RX ADMIN — Medication 2: at 10:45

## 2021-11-15 RX ADMIN — REMDESIVIR 500 MILLIGRAM(S): 5 INJECTION INTRAVENOUS at 05:24

## 2021-11-15 RX ADMIN — Medication 6 MILLIGRAM(S): at 05:23

## 2021-11-15 RX ADMIN — ENOXAPARIN SODIUM 70 MILLIGRAM(S): 100 INJECTION SUBCUTANEOUS at 17:51

## 2021-11-15 RX ADMIN — POLYETHYLENE GLYCOL 3350 17 GRAM(S): 17 POWDER, FOR SOLUTION ORAL at 13:00

## 2021-11-15 RX ADMIN — SENNA PLUS 2 TABLET(S): 8.6 TABLET ORAL at 21:24

## 2021-11-15 RX ADMIN — HEPARIN SODIUM 0 UNIT(S)/HR: 5000 INJECTION INTRAVENOUS; SUBCUTANEOUS at 09:22

## 2021-11-15 RX ADMIN — Medication 2: at 08:19

## 2021-11-15 RX ADMIN — Medication 75 MICROGRAM(S): at 05:24

## 2021-11-15 RX ADMIN — Medication 4: at 17:51

## 2021-11-15 RX ADMIN — ATORVASTATIN CALCIUM 20 MILLIGRAM(S): 80 TABLET, FILM COATED ORAL at 21:24

## 2021-11-15 NOTE — PROGRESS NOTE ADULT - SUBJECTIVE AND OBJECTIVE BOX
INTERVAL HPI:  70 yo male with HLD, Hypothyroidism  and Emphysema per history. Got 1st dose of Moderna Vaccine on 10/04/21.  Tested positive for COVID on 11/04/21.  Came with SOB, Cough and pleuritic chest pain, found to be hypoxic. Testing showed bilateral PE, lung infiltrates.  Admitted with hypoxic Respiratory failure.    OVERNIGHT EVENTS:  On high flow O2    Vital Signs Last 24 Hrs  T(C): 36.2 (15 Nov 2021 16:21), Max: 36.3 (15 Nov 2021 05:09)  T(F): 97.2 (15 Nov 2021 16:21), Max: 97.3 (15 Nov 2021 05:09)  HR: 78 (15 Nov 2021 16:35) (58 - 83)  BP: 104/65 (15 Nov 2021 16:21) (100/59 - 112/64)  BP(mean): --  RR: 18 (15 Nov 2021 16:35) (18 - 19)  SpO2: 94% (15 Nov 2021 16:35) (91% - 97%)    PHYSICAL EXAM:  SPO2 94% on high flow 50 litre/50%.    MEDICATIONS  (STANDING):  atorvastatin 20 milliGRAM(s) Oral at bedtime  dextrose 40% Gel 15 Gram(s) Oral once  dextrose 5%. 1000 milliLiter(s) (50 mL/Hr) IV Continuous <Continuous>  dextrose 5%. 1000 milliLiter(s) (100 mL/Hr) IV Continuous <Continuous>  dextrose 50% Injectable 25 Gram(s) IV Push once  dextrose 50% Injectable 12.5 Gram(s) IV Push once  dextrose 50% Injectable 25 Gram(s) IV Push once  glucagon  Injectable 1 milliGRAM(s) IntraMuscular once  insulin lispro (ADMELOG) corrective regimen sliding scale   SubCutaneous three times a day before meals  insulin lispro (ADMELOG) corrective regimen sliding scale   SubCutaneous at bedtime  levothyroxine 75 MICROGram(s) Oral daily  pantoprazole    Tablet 40 milliGRAM(s) Oral before breakfast  polyethylene glycol 3350 17 Gram(s) Oral daily  senna 2 Tablet(s) Oral at bedtime    MEDICATIONS  (PRN):  acetaminophen     Tablet .. 650 milliGRAM(s) Oral every 4 hours PRN Temp greater or equal to 38.5C (101.3F)  ALBUTerol    90 MICROgram(s) HFA Inhaler 2 Puff(s) Inhalation every 6 hours PRN Shortness of Breath  aluminum hydroxide/magnesium hydroxide/simethicone Suspension 30 milliLiter(s) Oral every 8 hours PRN Dyspepsia  guaiFENesin Oral Liquid (Sugar-Free) 200 milliGRAM(s) Oral every 6 hours PRN Cough    LABS:                        12.2   9.72  )-----------( 262      ( 15 Nov 2021 08:03 )             36.8     11-15    139  |  106  |  23  ----------------------------<  132<H>  4.2   |  28  |  0.88    Ca    8.1<L>      15 Nov 2021 08:03    TPro  6.7  /  Alb  2.1<L>  /  TBili  0.5  /  DBili  .21<H>  /  AST  65<H>  /  ALT  172<H>  /  AlkPhos  369<H>  11-15    PT/INR - ( 15 Nov 2021 08:03 )   PT: 17.5 sec;   INR: 1.54 ratio      PTT - ( 15 Nov 2021 17:11 )  PTT:30.7 sec  11-10 @ 22:46  pH: 7.45  pCO2: 34  pO2: 59  SaO2: 89  ASSESSMENT AND PLAN:  ·	Acute hypoxic Respiratory failure.  ·	COVID Pneumonia.  ·	Bilateral PE.  ·	Hypothyroidism.  ·	HLD.  ·	Anemia.    SPO2 94% on 50 litre/50% high flow.  On full anticoagulation and dexamethasone.

## 2021-11-15 NOTE — PROGRESS NOTE ADULT - ASSESSMENT
70 yo male with history of HLD, hypothyroidism who presents from home after testing positive for COVID on 11/4.   His symptoms started a week before Halloween   Presented hypoxic requiring 4L nasal cannula and was quicklly moved to HFNC where he feels more comfortable and saturation is better.   CTA (I personally reviewed) on admission showed bilateral subsegmental PE as well as GGO bilaterally and patient started on heparin drip.     11/12: still on HFNC, doing better, remains on heparin drip mil d transaminitis    11/15: no fevers, leukocytosis normalized, remains on HFNC, Cr ok, transaminitis is better, the pt completed 5 days course of remdesivir, need 4 more days of decadron - order re-instated.     #acute hypoxemic resp failure 2/2 COVID 19 pneumonia  #pulmonary embolism    #high serum lactate  #Gerd    #COVID  Monitor clinically.  Monitor Oxygenation  O2 supplementation.  Remdesivir 5 days course complete   Monitor CBC, CMP daily  Ferritin, CRP, and D dimer q 48 hrs.  IV Dexamethasone 6mg q24hrs x10 days  full dose Anticoagulation for pulmonary embolism   Treatment options are limited-this as well as limitations of data discussed with pt.  Monitor for any bacterial superinfection/complications.  discussed tocilizumab with patient and daughter: since he has been sick for more then 2 weeks and the benefit is small with a infectious risks we came to the conclusion of not giving tocilizumab   This tx plan was formulated utilizing my clinical judgement, currently available local/regional anecdotal information, organizational treatment recommendations with COVID-19 specific considerations given rapidly changing information available.    chest patient, proning as tolerated     #PE  AC per medicine  consider changing to full dose lovenox   wean oxygen   consider Transthoracic Echocardiogram     #lactate   was elevated on admission likely related to hypoxia  improved with oxygen and fluids    #GERD  patient takes omeprazole at home  he is on high dose steroids which he feels is worsening his gastritis  PPI  70 yo male with history of HLD, hypothyroidism who presents from home after testing positive for COVID on 11/4.   His symptoms started a week before Halloween   Presented hypoxic requiring 4L nasal cannula and was quicklly moved to HFNC where he feels more comfortable and saturation is better.   CTA (I personally reviewed) on admission showed bilateral subsegmental PE as well as GGO bilaterally and patient started on heparin drip.     11/12: still on HFNC, doing better, remains on heparin drip mil d transaminitis    11/15: no fevers, leukocytosis normalized, remains on HFNC, Cr ok, transaminitis is better, the pt completed 5 days course of remdesivir, need 4 more days of decadron - order re-instated.     #acute hypoxemic resp failure 2/2 COVID 19 pneumonia  #pulmonary embolism    #high serum lactate  #Gerd    #COVID  Monitor clinically.  Monitor Oxygenation  O2 supplementation.  Remdesivir 5 days course complete   Monitor CBC, CMP daily  Ferritin, CRP, and D dimer q 48 hrs.  IV Dexamethasone 6mg q24hrs x10 days  full dose Anticoagulation for pulmonary embolism   Treatment options are limited-this as well as limitations of data discussed with pt.  Monitor for any bacterial superinfection/complications.  discussed tocilizumab with patient and daughter: since he has been sick for more then 2 weeks and the benefit is small with a infectious risks we came to the conclusion of not giving tocilizumab   This tx plan was formulated utilizing my clinical judgement, currently available local/regional anecdotal information, organizational treatment recommendations with COVID-19 specific considerations given rapidly changing information available.    chest PT, proning as tolerated   please continue to titrate down the oxygen       #PE  AC per medicine  changed today to full dose lovenox   wean oxygen   consider Transthoracic Echocardiogram     #lactate   was elevated on admission likely related to hypoxia  improved with oxygen and fluids    #GERD  patient takes omeprazole at home  he is on high dose steroids which he feels is worsening his gastritis  PPI-Protonix has been started

## 2021-11-15 NOTE — PROVIDER CONTACT NOTE (CRITICAL VALUE NOTIFICATION) - ACTION/TREATMENT ORDERED:
Heparin nomogram followed. Heparin paused for 60 mins will restart at new rate
Awaiting further orders at this time
follow heparin normogram as protocol

## 2021-11-15 NOTE — PROGRESS NOTE ADULT - ASSESSMENT
70 yo male with history of Emphysema, HLD & hypothyroidism presented w/ acute hypoxic respiratory failure w/ severe sepsis DIRK due to COVID 19 PNA and bilateral subsegmental pulmonary embolism.      COVID19 PNA  - CT showed multifocal dense and ground-glass consolidations in a subpleural location and bibasilar predominance w/ adenopathy    - c/w Oxygen, patient is on high flow NC  - c/w dexamethasone  - s/p Remdesivir course   - ID following and noted that the patient did not meet criteria for Toci    - pulm note read and appreciated   - incentive spirometer; instructed on use  - proning encouraged  - OOB to chair when awake  - albuterol PRN  - lactic acidosis resolved   - c/w albuterol    Fusiform aneurysm of the ascending aorta measuring up to 4.4 cm  - will need vascular eval once COVID PNA has resolved    Transaminitis  - may due to COVID and sepsis  - check hepatitis panel    PreDM   - Hgb A1C is 6  - will make patient aware & [place] on diet/ lifestyle modifications  - c/w ISS    Bilateral PE  - CTA showed segmental and subsegmental pulmonary emboli in all lobes  - change heparin drip to Lovenox     Gastritis  - c/w Protonix w/ PRN Maalox    Hypothyroidism   - c/w levothyroxine    Hyperlipidemia  - c/w Atorvastatin - will hold if LFTs do not improve    Constipation   - c/w Miralax and Senna    Prophylaxis:  DVT: Lovenox  GI: Protonix 72 yo male with history of Emphysema, HLD & hypothyroidism presented w/ acute hypoxic respiratory failure w/ severe sepsis DIRK due to COVID 19 PNA and bilateral subsegmental pulmonary embolism.      COVID19 PNA  - CT showed multifocal dense and ground-glass consolidations in a subpleural location and bibasilar predominance w/ adenopathy    - c/w Oxygen, patient is on high flow NC  - c/w dexamethasone  - s/p Remdesivir course   - ID following and noted that the patient did not meet criteria for Toci    - pulm note read and appreciated   - incentive spirometer; instructed on use  - proning encouraged  - OOB to chair when awake  - albuterol PRN  - lactic acidosis resolved   - c/w albuterol    Fusiform aneurysm of the ascending aorta measuring up to 4.4 cm  - will need vascular eval once COVID PNA has resolved  - BP is well controlled off meds    Transaminitis  - may due to COVID and sepsis  - check hepatitis panel    PreDM   - Hgb A1C is 6  - will make patient aware & [place] on diet/ lifestyle modifications  - c/w ISS    Bilateral PE  - CTA showed segmental and subsegmental pulmonary emboli in all lobes  - change heparin drip to Lovenox     Gastritis  - c/w Protonix w/ PRN Maalox    Hypothyroidism   - c/w levothyroxine    Hyperlipidemia  - c/w Atorvastatin - will hold if LFTs do not improve    Constipation   - c/w Miralax and Senna    Prophylaxis:  DVT: Lovenox  GI: Protonix

## 2021-11-15 NOTE — PROGRESS NOTE ADULT - SUBJECTIVE AND OBJECTIVE BOX
70 yo male with history of Emphysema, HLD & hypothyroidism presented w/ acute hypoxic respiratory failure w/ severe sepsis DIRK due to COVID 19 PNA and bilateral subsegmental pulmonary embolism. He is lying in bed in NAD.    MEDICATIONS  (STANDING):  atorvastatin 20 milliGRAM(s) Oral at bedtime  dextrose 40% Gel 15 Gram(s) Oral once  dextrose 5%. 1000 milliLiter(s) (50 mL/Hr) IV Continuous <Continuous>  dextrose 5%. 1000 milliLiter(s) (100 mL/Hr) IV Continuous <Continuous>  dextrose 50% Injectable 25 Gram(s) IV Push once  dextrose 50% Injectable 12.5 Gram(s) IV Push once  dextrose 50% Injectable 25 Gram(s) IV Push once  glucagon  Injectable 1 milliGRAM(s) IntraMuscular once  insulin lispro (ADMELOG) corrective regimen sliding scale   SubCutaneous three times a day before meals  insulin lispro (ADMELOG) corrective regimen sliding scale   SubCutaneous at bedtime  levothyroxine 75 MICROGram(s) Oral daily  pantoprazole    Tablet 40 milliGRAM(s) Oral before breakfast  polyethylene glycol 3350 17 Gram(s) Oral daily  senna 2 Tablet(s) Oral at bedtime    MEDICATIONS  (PRN):  acetaminophen     Tablet .. 650 milliGRAM(s) Oral every 4 hours PRN Temp greater or equal to 38.5C (101.3F)  ALBUTerol    90 MICROgram(s) HFA Inhaler 2 Puff(s) Inhalation every 6 hours PRN Shortness of Breath  aluminum hydroxide/magnesium hydroxide/simethicone Suspension 30 milliLiter(s) Oral every 8 hours PRN Dyspepsia  guaiFENesin Oral Liquid (Sugar-Free) 200 milliGRAM(s) Oral every 6 hours PRN Cough      Allergies    No Known Allergies    Intolerances     Vital Signs Last 24 Hrs  T(C): 36.2 (15 Nov 2021 16:21), Max: 36.3 (15 Nov 2021 05:09)  T(F): 97.2 (15 Nov 2021 16:21), Max: 97.3 (15 Nov 2021 05:09)  HR: 78 (15 Nov 2021 16:35) (58 - 83)  BP: 104/65 (15 Nov 2021 16:21) (100/59 - 112/64)   RR: 18 (15 Nov 2021 16:35) (18 - 19)  SpO2: 94% (15 Nov 2021 16:35) (91% - 97%)    PHYSICAL EXAM:  GENERAL: NAD, well-groomed, well-developed  HEAD:  Atraumatic, Normocephalic  EYES: EOMI, PERRLA   NECK: Supple   NERVOUS SYSTEM:  Alert    CHEST/LUNG: Dec BS bilaterally   HEART: Regular rate and rhythm; No murmurs, rubs, or gallops  ABDOMEN: Soft, Nontender, Nondistended; Bowel sounds present  EXTREMITIES:  No clubbing, cyanosis, or edema       LABS:                        12.2   9.72  )-----------( 262      ( 15 Nov 2021 08:03 )             36.8     11-15    139  |  106  |  23  ----------------------------<  132<H>  4.2   |  28  |  0.88    Ca    8.1<L>      15 Nov 2021 08:03    TPro  6.7  /  Alb  2.1<L>  /  TBili  0.5  /  DBili  .21<H>  /  AST  65<H>  /  ALT  172<H>  /  AlkPhos  369<H>  11-15    PT/INR - ( 15 Nov 2021 08:03 )   PT: 17.5 sec;   INR: 1.54 ratio         PTT - ( 15 Nov 2021 17:11 )  PTT:30.7 sec    CAPILLARY BLOOD GLUCOSE      POCT Blood Glucose.: 205 mg/dL (15 Nov 2021 17:48)  POCT Blood Glucose.: 175 mg/dL (15 Nov 2021 10:36)  POCT Blood Glucose.: 171 mg/dL (15 Nov 2021 08:17)  POCT Blood Glucose.: 204 mg/dL (14 Nov 2021 21:19)      Culture - Blood (collected 11 Nov 2021 00:30)  Source: .Blood  Preliminary Report (12 Nov 2021 01:02):    No growth to date.    Culture - Blood (collected 11 Nov 2021 00:30)  Source: .Blood  Preliminary Report (12 Nov 2021 01:02):    No growth to date.      RADIOLOGY & ADDITIONAL TESTS:    11-14-21 @ 07:01  -  11-15-21 @ 07:00  --------------------------------------------------------  IN:  Total IN: 0 mL    OUT:    Voided (mL): 1750 mL  Total OUT: 1750 mL    Total NET: -1750 mL

## 2021-11-15 NOTE — PROGRESS NOTE ADULT - SUBJECTIVE AND OBJECTIVE BOX
ARTHUR ZAZUETA  MRN-[id number]    Follow Up: COVID 19, acute respiratory failure    Interval History: The pt was seen and examined earlier, no distress, states that he is feeling ok. The pt remains on HFNC, denies being short of breath with O2 SAT 92% during my exam. The pt is afebrile, WBC normalized, Cr ok, LFTs better.       PAST MEDICAL & SURGICAL HISTORY:  Hypothyroidism    Hyperlipidemia    Emphysema lung    No significant past surgical history        ROS:    [ ] Unobtainable because:  [x ] All other systems negative    Constitutional: no fever, no chills  Head: no trauma  Eyes: no vision changes, no eye pain  ENT:  no sore throat, no rhinorrhea  Cardiovascular:  no chest pain, no palpitation  Respiratory:  no SOB with supplemental O2, dry cough is better   GI:  no abd pain, no vomiting, no diarrhea  urinary: no dysuria, no hematuria, no flank pain  musculoskeletal:  no joint pain, no joint swelling  skin:  no rash  neurology:  no headache, no seizure, no change in mental status  psych: no anxiety, no depression         Allergies  No Known Allergies        ANTIMICROBIALS:      OTHER MEDS:  acetaminophen     Tablet .. 650 milliGRAM(s) Oral every 4 hours PRN  ALBUTerol    90 MICROgram(s) HFA Inhaler 2 Puff(s) Inhalation every 6 hours PRN  aluminum hydroxide/magnesium hydroxide/simethicone Suspension 30 milliLiter(s) Oral every 8 hours PRN  atorvastatin 20 milliGRAM(s) Oral at bedtime  dextrose 40% Gel 15 Gram(s) Oral once  dextrose 5%. 1000 milliLiter(s) IV Continuous <Continuous>  dextrose 5%. 1000 milliLiter(s) IV Continuous <Continuous>  dextrose 50% Injectable 25 Gram(s) IV Push once  dextrose 50% Injectable 12.5 Gram(s) IV Push once  dextrose 50% Injectable 25 Gram(s) IV Push once  enoxaparin Injectable 70 milliGRAM(s) SubCutaneous every 12 hours  glucagon  Injectable 1 milliGRAM(s) IntraMuscular once  guaiFENesin Oral Liquid (Sugar-Free) 200 milliGRAM(s) Oral every 6 hours PRN  insulin lispro (ADMELOG) corrective regimen sliding scale   SubCutaneous three times a day before meals  insulin lispro (ADMELOG) corrective regimen sliding scale   SubCutaneous at bedtime  levothyroxine 75 MICROGram(s) Oral daily  pantoprazole    Tablet 40 milliGRAM(s) Oral before breakfast  polyethylene glycol 3350 17 Gram(s) Oral daily  senna 2 Tablet(s) Oral at bedtime      Vital Signs Last 24 Hrs  T(C): 36.2 (15 Nov 2021 11:42), Max: 36.7 (14 Nov 2021 16:16)  T(F): 97.1 (15 Nov 2021 11:42), Max: 98 (14 Nov 2021 16:16)  HR: 78 (15 Nov 2021 11:45) (60 - 78)  BP: 112/64 (15 Nov 2021 11:42) (100/59 - 112/64)  BP(mean): --  RR: 18 (15 Nov 2021 11:45) (18 - 19)  SpO2: 94% (15 Nov 2021 11:45) (92% - 97%)    Physical Exam:  General:    NAD,  non toxic, on HFNC  Head: atraumatic, normocephalic  Eye: normal sclera and conjunctiva  ENT:    no oral lesions, neck supple  Cardio:     regular S1, S2,  no murmur  Respiratory:    diffuse coarse BS with crackles b/l,    no wheezing  abd:     soft,   BS +,   no tenderness  :   no CVAT,  no suprapubic tenderness,   no  monzon  Musculoskeletal:   no joint swelling,   no edema  vascular: no central lines, +PIV   Skin:    no rash  Neurologic:     no focal deficit  psych: normal affect    WBC Count: 9.72 K/uL (11-15 @ 08:03)  WBC Count: 11.93 K/uL (11-14 @ 06:36)  WBC Count: 9.93 K/uL (11-13 @ 07:20)  WBC Count: 14.82 K/uL (11-12 @ 07:16)  WBC Count: 7.62 K/uL (11-11 @ 07:01)  WBC Count: 11.76 K/uL (11-10 @ 20:18)                            12.2   9.72  )-----------( 262      ( 15 Nov 2021 08:03 )             36.8       11-15    139  |  106  |  23  ----------------------------<  132<H>  4.2   |  28  |  0.88    Ca    8.1<L>      15 Nov 2021 08:03    TPro  6.7  /  Alb  2.1<L>  /  TBili  0.5  /  DBili  .21<H>  /  AST  65<H>  /  ALT  172<H>  /  AlkPhos  369<H>  11-15          Creatinine Trend: 0.88<--, 0.81<--, 0.74<--, 0.72<--, 0.73<--, 0.85<--      MICROBIOLOGY:  v  .Blood  11-11-21   No growth to date.  --  --      D-Dimer Assay, Quantitative: 3224 (11-13)  D-Dimer Assay, Quantitative: 66052 (11-10)        RADIOLOGY:     ARTHUR ZAZUETA  MRN-[id number]    Follow Up: COVID 19, acute respiratory failure    Interval History: The pt was seen and examined earlier, no distress, states that he is feeling ok. The pt remains on HFNC, denies being short of breath with O2 SAT 92% during my exam. The pt is afebrile, WBC normalized, Cr ok, LFTs better.       PAST MEDICAL & SURGICAL HISTORY:  Hypothyroidism    Hyperlipidemia    Emphysema lung    No significant past surgical history        ROS:    [ ] Unobtainable because:  [x ] All other systems negative    Constitutional: no fever, no chills  Head: no trauma  Eyes: no vision changes, no eye pain  ENT:  no sore throat, no rhinorrhea  Cardiovascular:  no chest pain, no palpitation  Respiratory:  no SOB with supplemental O2, dry cough is better   GI:  no abd pain, no vomiting, no diarrhea  urinary: no dysuria, no hematuria, no flank pain  musculoskeletal:  no joint pain, no joint swelling  skin:  no rash  neurology:  no headache, no seizure, no change in mental status  psych: no anxiety, no depression         Allergies  No Known Allergies        ANTIMICROBIALS:      OTHER MEDS:  acetaminophen     Tablet .. 650 milliGRAM(s) Oral every 4 hours PRN  ALBUTerol    90 MICROgram(s) HFA Inhaler 2 Puff(s) Inhalation every 6 hours PRN  aluminum hydroxide/magnesium hydroxide/simethicone Suspension 30 milliLiter(s) Oral every 8 hours PRN  atorvastatin 20 milliGRAM(s) Oral at bedtime  dextrose 40% Gel 15 Gram(s) Oral once  dextrose 5%. 1000 milliLiter(s) IV Continuous <Continuous>  dextrose 5%. 1000 milliLiter(s) IV Continuous <Continuous>  dextrose 50% Injectable 25 Gram(s) IV Push once  dextrose 50% Injectable 12.5 Gram(s) IV Push once  dextrose 50% Injectable 25 Gram(s) IV Push once  enoxaparin Injectable 70 milliGRAM(s) SubCutaneous every 12 hours  glucagon  Injectable 1 milliGRAM(s) IntraMuscular once  guaiFENesin Oral Liquid (Sugar-Free) 200 milliGRAM(s) Oral every 6 hours PRN  insulin lispro (ADMELOG) corrective regimen sliding scale   SubCutaneous three times a day before meals  insulin lispro (ADMELOG) corrective regimen sliding scale   SubCutaneous at bedtime  levothyroxine 75 MICROGram(s) Oral daily  pantoprazole    Tablet 40 milliGRAM(s) Oral before breakfast  polyethylene glycol 3350 17 Gram(s) Oral daily  senna 2 Tablet(s) Oral at bedtime      Vital Signs Last 24 Hrs  T(C): 36.2 (15 Nov 2021 11:42), Max: 36.7 (14 Nov 2021 16:16)  T(F): 97.1 (15 Nov 2021 11:42), Max: 98 (14 Nov 2021 16:16)  HR: 78 (15 Nov 2021 11:45) (60 - 78)  BP: 112/64 (15 Nov 2021 11:42) (100/59 - 112/64)  BP(mean): --  RR: 18 (15 Nov 2021 11:45) (18 - 19)  SpO2: 94% (15 Nov 2021 11:45) (92% - 97%)    Physical Exam:  General:    NAD,  non toxic, on HFNC  Head: atraumatic, normocephalic  Eye: normal sclera and conjunctiva  ENT:    no oral lesions, neck supple  Cardio:     regular S1, S2,  no murmur  Respiratory:    diffuse coarse BS with crackles b/l,    no wheezing  abd:     soft,   BS +,   no tenderness  :   no CVAT,  no suprapubic tenderness,   no  monzon  Musculoskeletal:   no joint swelling,   no edema  vascular: no central lines, +PIV   Skin:    no rash  Neurologic:     no focal deficit  psych: normal affect    WBC Count: 9.72 K/uL (11-15 @ 08:03)  WBC Count: 11.93 K/uL (11-14 @ 06:36)  WBC Count: 9.93 K/uL (11-13 @ 07:20)  WBC Count: 14.82 K/uL (11-12 @ 07:16)  WBC Count: 7.62 K/uL (11-11 @ 07:01)  WBC Count: 11.76 K/uL (11-10 @ 20:18)                            12.2   9.72  )-----------( 262      ( 15 Nov 2021 08:03 )             36.8       11-15    139  |  106  |  23  ----------------------------<  132<H>  4.2   |  28  |  0.88    Ca    8.1<L>      15 Nov 2021 08:03    TPro  6.7  /  Alb  2.1<L>  /  TBili  0.5  /  DBili  .21<H>  /  AST  65<H>  /  ALT  172<H>  /  AlkPhos  369<H>  11-15          Creatinine Trend: 0.88<--, 0.81<--, 0.74<--, 0.72<--, 0.73<--, 0.85<--      MICROBIOLOGY:  v  .Blood  11-11-21   No growth to date.  --  --      D-Dimer Assay, Quantitative: 3224 (11-13)  D-Dimer Assay, Quantitative: 50835 (11-10)        RADIOLOGY:  no new imaging

## 2021-11-16 LAB
ALBUMIN SERPL ELPH-MCNC: 2.2 G/DL — LOW (ref 3.3–5)
ALBUMIN SERPL ELPH-MCNC: 2.3 G/DL — LOW (ref 3.3–5)
ALP SERPL-CCNC: 379 U/L — HIGH (ref 40–120)
ALP SERPL-CCNC: 379 U/L — HIGH (ref 40–120)
ALT FLD-CCNC: 181 U/L — HIGH (ref 12–78)
ALT FLD-CCNC: 181 U/L — HIGH (ref 12–78)
ANION GAP SERPL CALC-SCNC: 5 MMOL/L — SIGNIFICANT CHANGE UP (ref 5–17)
AST SERPL-CCNC: 59 U/L — HIGH (ref 15–37)
AST SERPL-CCNC: 60 U/L — HIGH (ref 15–37)
BILIRUB DIRECT SERPL-MCNC: 0.2 MG/DL — SIGNIFICANT CHANGE UP (ref 0.05–0.2)
BILIRUB DIRECT SERPL-MCNC: 0.2 MG/DL — SIGNIFICANT CHANGE UP (ref 0.05–0.2)
BILIRUB INDIRECT FLD-MCNC: 0.3 MG/DL — SIGNIFICANT CHANGE UP (ref 0.2–1)
BILIRUB INDIRECT FLD-MCNC: 0.3 MG/DL — SIGNIFICANT CHANGE UP (ref 0.2–1)
BILIRUB SERPL-MCNC: 0.5 MG/DL — SIGNIFICANT CHANGE UP (ref 0.2–1.2)
BILIRUB SERPL-MCNC: 0.5 MG/DL — SIGNIFICANT CHANGE UP (ref 0.2–1.2)
BUN SERPL-MCNC: 27 MG/DL — HIGH (ref 7–23)
CALCIUM SERPL-MCNC: 8.4 MG/DL — LOW (ref 8.5–10.1)
CHLORIDE SERPL-SCNC: 103 MMOL/L — SIGNIFICANT CHANGE UP (ref 96–108)
CK SERPL-CCNC: 26 U/L — SIGNIFICANT CHANGE UP (ref 26–308)
CO2 SERPL-SCNC: 28 MMOL/L — SIGNIFICANT CHANGE UP (ref 22–31)
CREAT SERPL-MCNC: 0.88 MG/DL — SIGNIFICANT CHANGE UP (ref 0.5–1.3)
CREAT SERPL-MCNC: 0.91 MG/DL — SIGNIFICANT CHANGE UP (ref 0.5–1.3)
CRP SERPL-MCNC: 14 MG/L — HIGH
D DIMER BLD IA.RAPID-MCNC: 2223 NG/ML DDU — HIGH
FERRITIN SERPL-MCNC: 1075 NG/ML — HIGH (ref 30–400)
GLUCOSE BLDC GLUCOMTR-MCNC: 120 MG/DL — HIGH (ref 70–99)
GLUCOSE BLDC GLUCOMTR-MCNC: 129 MG/DL — HIGH (ref 70–99)
GLUCOSE BLDC GLUCOMTR-MCNC: 158 MG/DL — HIGH (ref 70–99)
GLUCOSE BLDC GLUCOMTR-MCNC: 169 MG/DL — HIGH (ref 70–99)
GLUCOSE SERPL-MCNC: 106 MG/DL — HIGH (ref 70–99)
HCT VFR BLD CALC: 39.9 % — SIGNIFICANT CHANGE UP (ref 39–50)
HGB BLD-MCNC: 13.2 G/DL — SIGNIFICANT CHANGE UP (ref 13–17)
INR BLD: 1.34 RATIO — HIGH (ref 0.88–1.16)
LACTATE SERPL-SCNC: 1.4 MMOL/L — SIGNIFICANT CHANGE UP (ref 0.7–2)
LDH SERPL L TO P-CCNC: 470 U/L — HIGH (ref 50–242)
MAGNESIUM SERPL-MCNC: 2.5 MG/DL — SIGNIFICANT CHANGE UP (ref 1.6–2.6)
MCHC RBC-ENTMCNC: 29.3 PG — SIGNIFICANT CHANGE UP (ref 27–34)
MCHC RBC-ENTMCNC: 33.1 GM/DL — SIGNIFICANT CHANGE UP (ref 32–36)
MCV RBC AUTO: 88.5 FL — SIGNIFICANT CHANGE UP (ref 80–100)
NRBC # BLD: 0 /100 WBCS — SIGNIFICANT CHANGE UP (ref 0–0)
PHOSPHATE SERPL-MCNC: 2.6 MG/DL — SIGNIFICANT CHANGE UP (ref 2.5–4.5)
PLATELET # BLD AUTO: 302 K/UL — SIGNIFICANT CHANGE UP (ref 150–400)
POTASSIUM SERPL-MCNC: 4.2 MMOL/L — SIGNIFICANT CHANGE UP (ref 3.5–5.3)
POTASSIUM SERPL-SCNC: 4.2 MMOL/L — SIGNIFICANT CHANGE UP (ref 3.5–5.3)
PROT SERPL-MCNC: 7.1 GM/DL — SIGNIFICANT CHANGE UP (ref 6–8.3)
PROT SERPL-MCNC: 7.1 GM/DL — SIGNIFICANT CHANGE UP (ref 6–8.3)
PROTHROM AB SERPL-ACNC: 15.3 SEC — HIGH (ref 10.6–13.6)
RBC # BLD: 4.51 M/UL — SIGNIFICANT CHANGE UP (ref 4.2–5.8)
RBC # FLD: 13.8 % — SIGNIFICANT CHANGE UP (ref 10.3–14.5)
SODIUM SERPL-SCNC: 136 MMOL/L — SIGNIFICANT CHANGE UP (ref 135–145)
TSH SERPL-MCNC: 0.79 UIU/ML — SIGNIFICANT CHANGE UP (ref 0.36–3.74)
WBC # BLD: 11.35 K/UL — HIGH (ref 3.8–10.5)
WBC # FLD AUTO: 11.35 K/UL — HIGH (ref 3.8–10.5)

## 2021-11-16 PROCEDURE — 99232 SBSQ HOSP IP/OBS MODERATE 35: CPT

## 2021-11-16 RX ORDER — SODIUM CHLORIDE 0.65 %
1 AEROSOL, SPRAY (ML) NASAL DAILY
Refills: 0 | Status: DISCONTINUED | OUTPATIENT
Start: 2021-11-16 | End: 2021-11-20

## 2021-11-16 RX ADMIN — ENOXAPARIN SODIUM 70 MILLIGRAM(S): 100 INJECTION SUBCUTANEOUS at 06:03

## 2021-11-16 RX ADMIN — Medication 75 MICROGRAM(S): at 06:03

## 2021-11-16 RX ADMIN — ATORVASTATIN CALCIUM 20 MILLIGRAM(S): 80 TABLET, FILM COATED ORAL at 21:25

## 2021-11-16 RX ADMIN — ENOXAPARIN SODIUM 70 MILLIGRAM(S): 100 INJECTION SUBCUTANEOUS at 17:24

## 2021-11-16 RX ADMIN — Medication 2: at 12:16

## 2021-11-16 RX ADMIN — SENNA PLUS 2 TABLET(S): 8.6 TABLET ORAL at 21:25

## 2021-11-16 RX ADMIN — Medication 6 MILLIGRAM(S): at 06:03

## 2021-11-16 RX ADMIN — Medication 1 SPRAY(S): at 13:44

## 2021-11-16 RX ADMIN — PANTOPRAZOLE SODIUM 40 MILLIGRAM(S): 20 TABLET, DELAYED RELEASE ORAL at 06:03

## 2021-11-16 RX ADMIN — POLYETHYLENE GLYCOL 3350 17 GRAM(S): 17 POWDER, FOR SOLUTION ORAL at 12:16

## 2021-11-16 NOTE — PROGRESS NOTE ADULT - ASSESSMENT
72 yo male with history of HLD, hypothyroidism who presents from home after testing positive for COVID on 11/4.   His symptoms started a week before Halloween   Presented hypoxic requiring 4L nasal cannula and was quicklly moved to HFNC where he feels more comfortable and saturation is better.   CTA (I personally reviewed) on admission showed bilateral subsegmental PE as well as GGO bilaterally and patient started on heparin drip.     11/12: still on HFNC, doing better, remains on heparin drip mil d transaminitis    11/15: no fevers, leukocytosis normalized, remains on HFNC, Cr ok, transaminitis is better, the pt completed 5 days course of remdesivir, need 4 more days of decadron - order re-instated.     #acute hypoxemic resp failure 2/2 COVID 19 pneumonia  #pulmonary embolism    #high serum lactate  #Gerd    #COVID  Monitor clinically.  Monitor Oxygenation  O2 supplementation.  Remdesivir 5 days course complete   Monitor CBC, CMP daily  Ferritin, CRP, and D dimer q 48 hrs.  IV Dexamethasone 6mg q24hrs x10 days  full dose Anticoagulation for pulmonary embolism   Treatment options are limited-this as well as limitations of data discussed with pt.  Monitor for any bacterial superinfection/complications.  discussed tocilizumab with patient and daughter: since he has been sick for more then 2 weeks and the benefit is small with a infectious risks we came to the conclusion of not giving tocilizumab   This tx plan was formulated utilizing my clinical judgement, currently available local/regional anecdotal information, organizational treatment recommendations with COVID-19 specific considerations given rapidly changing information available.    chest PT, proning as tolerated   please continue to titrate down the oxygen       #PE  AC per medicine  changed today to full dose lovenox   wean oxygen   consider Transthoracic Echocardiogram     #lactate   was elevated on admission likely related to hypoxia  improved with oxygen and fluids    #GERD  patient takes omeprazole at home  he is on high dose steroids which he feels is worsening his gastritis  PPI-Protonix has been started   70 yo male with history of HLD, hypothyroidism who presents from home after testing positive for COVID on 11/4.   His symptoms started a week before Halloween   Presented hypoxic requiring 4L nasal cannula and was quicklly moved to HFNC where he feels more comfortable and saturation is better.   CTA (I personally reviewed) on admission showed bilateral subsegmental PE as well as GGO bilaterally and patient started on heparin drip.     11/12: still on HFNC, doing better, remains on heparin drip mil d transaminitis    11/15: no fevers, leukocytosis normalized, remains on HFNC, Cr ok, transaminitis is better, the pt completed 5 days course of remdesivir, need 4 more days of decadron - order re-instated.   11/16: titrating oxygen, feels well, vitals stable other then hypoxia,       #acute hypoxemic resp failure 2/2 COVID 19 pneumonia  #pulmonary embolism    #high serum lactate  #Gerd    #COVID  Monitor clinically.  Monitor Oxygenation  O2 supplementation.  Remdesivir 5 days course complete   Monitor CBC, CMP daily  Ferritin, CRP, and D dimer q 48 hrs.  IV Dexamethasone 6mg q24hrs x10 days  full dose Anticoagulation for pulmonary embolism   Treatment options are limited-this as well as limitations of data discussed with pt.  Monitor for any bacterial superinfection/complications.  discussed tocilizumab with patient and daughter: since he has been sick for more then 2 weeks and the benefit is small with a infectious risks we came to the conclusion of not giving tocilizumab   This tx plan was formulated utilizing my clinical judgement, currently available local/regional anecdotal information, organizational treatment recommendations with COVID-19 specific considerations given rapidly changing information available.    chest PT, proning as tolerated   please continue to titrate down the oxygen       #PE  AC per medicine  continue full dose AC with lovenox   wean oxygen   consider Transthoracic Echocardiogram     #lactate   was elevated on admission likely related to hypoxia  improved with oxygen and fluids    #GERD  patient takes omeprazole at home  he is on high dose steroids which he feels is worsening his gastritis  PPI-Protonix has been started      D/W Dr. Rodo Vernon, DO  Infectious Disease Attending  Pager [phone]  After 5pm/weekends please call [phone] for all inquiries and new consults

## 2021-11-16 NOTE — PROGRESS NOTE ADULT - SUBJECTIVE AND OBJECTIVE BOX
70 yo male with history of Emphysema, HLD & hypothyroidism presented w/ acute hypoxic respiratory failure w/ severe sepsis DIRK due to COVID 19 PNA and bilateral subsegmental pulmonary embolism. He is lying in bed in NAD.     MEDICATIONS  (STANDING):  atorvastatin 20 milliGRAM(s) Oral at bedtime  dexAMETHasone  Injectable 6 milliGRAM(s) IV Push daily  dextrose 40% Gel 15 Gram(s) Oral once  dextrose 5%. 1000 milliLiter(s) (50 mL/Hr) IV Continuous <Continuous>  dextrose 5%. 1000 milliLiter(s) (100 mL/Hr) IV Continuous <Continuous>  dextrose 50% Injectable 25 Gram(s) IV Push once  dextrose 50% Injectable 12.5 Gram(s) IV Push once  dextrose 50% Injectable 25 Gram(s) IV Push once  enoxaparin Injectable 70 milliGRAM(s) SubCutaneous <User Schedule>  glucagon  Injectable 1 milliGRAM(s) IntraMuscular once  insulin lispro (ADMELOG) corrective regimen sliding scale   SubCutaneous three times a day before meals  insulin lispro (ADMELOG) corrective regimen sliding scale   SubCutaneous at bedtime  levothyroxine 75 MICROGram(s) Oral daily  pantoprazole    Tablet 40 milliGRAM(s) Oral before breakfast  polyethylene glycol 3350 17 Gram(s) Oral daily  senna 2 Tablet(s) Oral at bedtime  sodium chloride 0.65% Nasal 1 Spray(s) Both Nostrils daily    MEDICATIONS  (PRN):  acetaminophen     Tablet .. 650 milliGRAM(s) Oral every 4 hours PRN Temp greater or equal to 38.5C (101.3F)  ALBUTerol    90 MICROgram(s) HFA Inhaler 2 Puff(s) Inhalation every 6 hours PRN Shortness of Breath  aluminum hydroxide/magnesium hydroxide/simethicone Suspension 30 milliLiter(s) Oral every 8 hours PRN Dyspepsia  guaiFENesin Oral Liquid (Sugar-Free) 200 milliGRAM(s) Oral every 6 hours PRN Cough      Allergies    No Known Allergies    Intolerances        Vital Signs Last 24 Hrs  T(C): 36.4 (16 Nov 2021 16:45), Max: 36.4 (15 Nov 2021 23:43)  T(F): 97.5 (16 Nov 2021 16:45), Max: 97.6 (16 Nov 2021 11:00)  HR: 82 (16 Nov 2021 17:09) (59 - 82)  BP: 101/61 (16 Nov 2021 16:45) (101/61 - 125/73)  BP(mean): --  RR: 17 (16 Nov 2021 17:09) (17 - 20)  SpO2: 99% (16 Nov 2021 17:09) (89% - 99%)    PHYSICAL EXAM:  GENERAL: NAD, well-groomed, well-developed  HEAD:  Atraumatic, Normocephalic  EYES: EOMI, PERRLA   NECK: Supple   NERVOUS SYSTEM:  Alert    CHEST/LUNG: Dec BS bilaterally   HEART: Regular rate and rhythm; No murmurs, rubs, or gallops  ABDOMEN: Soft, Nontender, Nondistended; Bowel sounds present  EXTREMITIES:  No clubbing, cyanosis, or edema    LABS:                        13.2   11.35 )-----------( 302      ( 16 Nov 2021 07:27 )             39.9     11-16    136  |  103  |  27<H>  ----------------------------<  106<H>  4.2   |  28  |  0.91    Ca    8.4<L>      16 Nov 2021 07:27  Phos  2.6     11-16  Mg     2.5     11-16    TPro  7.1  /  Alb  2.2<L>  /  TBili  0.5  /  DBili  .20  /  AST  59<H>  /  ALT  181<H>  /  AlkPhos  379<H>  11-16    PT/INR - ( 16 Nov 2021 07:27 )   PT: 15.3 sec;   INR: 1.34 ratio         PTT - ( 15 Nov 2021 17:11 )  PTT:30.7 sec    CAPILLARY BLOOD GLUCOSE      POCT Blood Glucose.: 169 mg/dL (16 Nov 2021 21:20)  POCT Blood Glucose.: 129 mg/dL (16 Nov 2021 17:26)  POCT Blood Glucose.: 158 mg/dL (16 Nov 2021 12:15)  POCT Blood Glucose.: 120 mg/dL (16 Nov 2021 07:41)      RADIOLOGY & ADDITIONAL TESTS:    11-15-21 @ 07:01  -  11-16-21 @ 07:00  --------------------------------------------------------  IN:  Total IN: 0 mL    OUT:    Voided (mL): 500 mL  Total OUT: 500 mL    Total NET: -500 mL

## 2021-11-16 NOTE — PROGRESS NOTE ADULT - SUBJECTIVE AND OBJECTIVE BOX
ARTHUR ZAZUETA  MRN-[id number]    Follow Up: COVID 19, acute respiratory failure    Interval History: The pt was seen and examined earlier, no distress, states that he is feeling ok. The pt remains on HFNC, denies being short of breath with O2 SAT 92% during my exam. The pt is afebrile, WBC normalized, Cr ok, LFTs better.       PAST MEDICAL & SURGICAL HISTORY:  Hypothyroidism    Hyperlipidemia    Emphysema lung    No significant past surgical history        ROS:    [ ] Unobtainable because:  [x ] All other systems negative    Constitutional: no fever, no chills  Head: no trauma  Eyes: no vision changes, no eye pain  ENT:  no sore throat, no rhinorrhea  Cardiovascular:  no chest pain, no palpitation  Respiratory:  no SOB with supplemental O2, dry cough is better   GI:  no abd pain, no vomiting, no diarrhea  urinary: no dysuria, no hematuria, no flank pain  musculoskeletal:  no joint pain, no joint swelling  skin:  no rash  neurology:  no headache, no seizure, no change in mental status  psych: no anxiety, no depression         Allergies  No Known Allergies        ANTIMICROBIALS:      OTHER MEDS:  acetaminophen     Tablet .. 650 milliGRAM(s) Oral every 4 hours PRN  ALBUTerol    90 MICROgram(s) HFA Inhaler 2 Puff(s) Inhalation every 6 hours PRN  aluminum hydroxide/magnesium hydroxide/simethicone Suspension 30 milliLiter(s) Oral every 8 hours PRN  atorvastatin 20 milliGRAM(s) Oral at bedtime  dextrose 40% Gel 15 Gram(s) Oral once  dextrose 5%. 1000 milliLiter(s) IV Continuous <Continuous>  dextrose 5%. 1000 milliLiter(s) IV Continuous <Continuous>  dextrose 50% Injectable 25 Gram(s) IV Push once  dextrose 50% Injectable 12.5 Gram(s) IV Push once  dextrose 50% Injectable 25 Gram(s) IV Push once  enoxaparin Injectable 70 milliGRAM(s) SubCutaneous every 12 hours  glucagon  Injectable 1 milliGRAM(s) IntraMuscular once  guaiFENesin Oral Liquid (Sugar-Free) 200 milliGRAM(s) Oral every 6 hours PRN  insulin lispro (ADMELOG) corrective regimen sliding scale   SubCutaneous three times a day before meals  insulin lispro (ADMELOG) corrective regimen sliding scale   SubCutaneous at bedtime  levothyroxine 75 MICROGram(s) Oral daily  pantoprazole    Tablet 40 milliGRAM(s) Oral before breakfast  polyethylene glycol 3350 17 Gram(s) Oral daily  senna 2 Tablet(s) Oral at bedtime      Vital Signs Last 24 Hrs  T(C): 36.2 (15 Nov 2021 11:42), Max: 36.7 (14 Nov 2021 16:16)  T(F): 97.1 (15 Nov 2021 11:42), Max: 98 (14 Nov 2021 16:16)  HR: 78 (15 Nov 2021 11:45) (60 - 78)  BP: 112/64 (15 Nov 2021 11:42) (100/59 - 112/64)  BP(mean): --  RR: 18 (15 Nov 2021 11:45) (18 - 19)  SpO2: 94% (15 Nov 2021 11:45) (92% - 97%)    Physical Exam:  General:    NAD,  non toxic, on HFNC  Head: atraumatic, normocephalic  Eye: normal sclera and conjunctiva  ENT:    no oral lesions, neck supple  Cardio:     regular S1, S2,  no murmur  Respiratory:    diffuse coarse BS with crackles b/l,    no wheezing  abd:     soft,   BS +,   no tenderness  :   no CVAT,  no suprapubic tenderness,   no  monzon  Musculoskeletal:   no joint swelling,   no edema  vascular: no central lines, +PIV   Skin:    no rash  Neurologic:     no focal deficit  psych: normal affect    WBC Count: 9.72 K/uL (11-15 @ 08:03)  WBC Count: 11.93 K/uL (11-14 @ 06:36)  WBC Count: 9.93 K/uL (11-13 @ 07:20)  WBC Count: 14.82 K/uL (11-12 @ 07:16)  WBC Count: 7.62 K/uL (11-11 @ 07:01)  WBC Count: 11.76 K/uL (11-10 @ 20:18)                            12.2   9.72  )-----------( 262      ( 15 Nov 2021 08:03 )             36.8       11-15    139  |  106  |  23  ----------------------------<  132<H>  4.2   |  28  |  0.88    Ca    8.1<L>      15 Nov 2021 08:03    TPro  6.7  /  Alb  2.1<L>  /  TBili  0.5  /  DBili  .21<H>  /  AST  65<H>  /  ALT  172<H>  /  AlkPhos  369<H>  11-15          Creatinine Trend: 0.88<--, 0.81<--, 0.74<--, 0.72<--, 0.73<--, 0.85<--      MICROBIOLOGY:  v  .Blood  11-11-21   No growth to date.  --  --      D-Dimer Assay, Quantitative: 3224 (11-13)  D-Dimer Assay, Quantitative: 11113 (11-10)        RADIOLOGY:  no new imaging    ROSELISETARTHUR BEDOYA  MRN-[id number]    Follow Up: COVID 19, acute respiratory failure    Interval History: The pt was seen and examined earlier, no distress, doing well and was taken off HFNC and put on nasal cannula with 10L of oxygen     PAST MEDICAL & SURGICAL HISTORY:  Hypothyroidism    Hyperlipidemia    Emphysema lung    No significant past surgical history        ROS:    [ ] Unobtainable because:  [x ] All other systems negative    Constitutional: no fever, no chills  Head: no trauma  Eyes: no vision changes, no eye pain  ENT:  no sore throat, no rhinorrhea  Cardiovascular:  no chest pain, no palpitation  Respiratory:  no SOB with supplemental O2, dry cough is better   GI:  no abd pain, no vomiting, no diarrhea  urinary: no dysuria, no hematuria, no flank pain  musculoskeletal:  no joint pain, no joint swelling  skin:  no rash  neurology:  no headache, no seizure, no change in mental status  psych: no anxiety, no depression         Allergies  No Known Allergies        ANTIMICROBIALS:        MEDICATIONS  (STANDING):  atorvastatin 20 at bedtime  dexAMETHasone  Injectable 6 daily  dextrose 40% Gel 15 once  dextrose 50% Injectable 25 once  dextrose 50% Injectable 12.5 once  dextrose 50% Injectable 25 once  enoxaparin Injectable 70 <User Schedule>  glucagon  Injectable 1 once  insulin lispro (ADMELOG) corrective regimen sliding scale  three times a day before meals  insulin lispro (ADMELOG) corrective regimen sliding scale  at bedtime  levothyroxine 75 daily  pantoprazole    Tablet 40 before breakfast  polyethylene glycol 3350 17 daily  senna 2 at bedtime      PRN  acetaminophen     Tablet .. 650 milliGRAM(s) Oral every 4 hours PRN  ALBUTerol    90 MICROgram(s) HFA Inhaler 2 Puff(s) Inhalation every 6 hours PRN  aluminum hydroxide/magnesium hydroxide/simethicone Suspension 30 milliLiter(s) Oral every 8 hours PRN  guaiFENesin Oral Liquid (Sugar-Free) 200 milliGRAM(s) Oral every 6 hours PRN      Physical Exam:  Vital Signs Last 24 Hrs  T(F): 97.6 (11-16-21 @ 11:00), Max: 97.6 (11-16-21 @ 11:00)  HR: 63 (11-16-21 @ 11:00)  BP: 125/73 (11-16-21 @ 11:00)  RR: 18 (11-16-21 @ 12:17)  SpO2: 94% (11-16-21 @ 12:17) (90% - 96%)  Wt(kg): --    Physical Exam:  General:    NAD,  non toxic, on HFNC  Head: atraumatic, normocephalic  Eye: normal sclera and conjunctiva  ENT:    no oral lesions, neck supple  Cardio:     regular S1, S2,  no murmur  Respiratory:    diffuse coarse BS with crackles b/l,    no wheezing  abd:     soft,   BS +,   no tenderness  :   no CVAT,  no suprapubic tenderness,   no  monzon  Musculoskeletal:   no joint swelling,   no edema  vascular: no central lines, +PIV   Skin:    no rash  Neurologic:     no focal deficit  psych: normal affect    WBC Count: 11.35 K/uL (11-16 @ 07:27)  WBC Count: 9.72 K/uL (11-15 @ 08:03)  WBC Count: 11.93 K/uL (11-14 @ 06:36)  WBC Count: 9.93 K/uL (11-13 @ 07:20)  WBC Count: 14.82 K/uL (11-12 @ 07:16)  WBC Count: 7.62 K/uL (11-11 @ 07:01)  WBC Count: 11.76 K/uL (11-10 @ 20:18)                            13.2   11.35 )-----------( 302      ( 16 Nov 2021 07:27 )             39.9       11-16    136  |  103  |  27<H>  ----------------------------<  106<H>  4.2   |  28  |  0.91    Ca    8.4<L>      16 Nov 2021 07:27  Phos  2.6     11-16  Mg     2.5     11-16    TPro  7.1  /  Alb  2.2<L>  /  TBili  0.5  /  DBili  .20  /  AST  59<H>  /  ALT  181<H>  /  AlkPhos  379<H>  11-16      Creatinine Trend: 0.91<--, 0.88<--, 0.81<--, 0.74<--, 0.72<--, 0.73<--    Lactate, Blood: 1.4 mmol/L (11-16-21 @ 07:27)      MICROBIOLOGY:  v  .Blood  11-11-21   No growth to date.  --  --      D-Dimer Assay, Quantitative: 3224 (11-13)  D-Dimer Assay, Quantitative: 25671 (11-10)        RADIOLOGY:  no new imaging

## 2021-11-16 NOTE — PROGRESS NOTE ADULT - ASSESSMENT
72 yo male with history of Emphysema, HLD & hypothyroidism presented w/ acute hypoxic respiratory failure w/ severe sepsis DIRK due to COVID 19 PNA and bilateral subsegmental pulmonary embolism.      COVID19 PNA  - CT showed multifocal dense and ground-glass consolidations in a subpleural location and bibasilar predominance w/ adenopathy    - c/w Oxygen, patient is now off high flow NC  - c/w dexamethasone  - s/p Remdesivir course   - ID following and noted that the patient did not meet criteria for Toci    - pulm note read and appreciated   - incentive spirometer; instructed on use  - proning encouraged  - OOB to chair when awake  - albuterol PRN  - lactic acidosis resolved   - c/w albuterol    Fusiform aneurysm of the ascending aorta measuring up to 4.4 cm  - will need vascular eval once COVID PNA has resolved  - BP is well controlled off meds    Transaminitis  - may due to COVID and sepsis  - check hepatitis panel    PreDM   - Hgb A1C is 6  - will make patient aware & [place] on diet/ lifestyle modifications  - c/w ISS    Bilateral PE  - CTA showed segmental and subsegmental pulmonary emboli in all lobes  - changed heparin drip to Lovenox   - check Echo     Gastritis  - c/w Protonix w/ PRN Maalox    Hypothyroidism   - c/w levothyroxine    Hyperlipidemia  - c/w Atorvastatin - will hold if LFTs do not improve    Constipation   - c/w Miralax and Senna    Prophylaxis:  DVT: Lovenox  GI: Protonix

## 2021-11-17 LAB
ALBUMIN SERPL ELPH-MCNC: 2.6 G/DL — LOW (ref 3.3–5)
ALBUMIN SERPL ELPH-MCNC: 2.6 G/DL — LOW (ref 3.3–5)
ALP SERPL-CCNC: 368 U/L — HIGH (ref 40–120)
ALP SERPL-CCNC: 368 U/L — HIGH (ref 40–120)
ALT FLD-CCNC: 149 U/L — HIGH (ref 12–78)
ALT FLD-CCNC: 151 U/L — HIGH (ref 12–78)
ANION GAP SERPL CALC-SCNC: 3 MMOL/L — LOW (ref 5–17)
AST SERPL-CCNC: 37 U/L — SIGNIFICANT CHANGE UP (ref 15–37)
AST SERPL-CCNC: 37 U/L — SIGNIFICANT CHANGE UP (ref 15–37)
BILIRUB DIRECT SERPL-MCNC: 0.27 MG/DL — HIGH (ref 0.05–0.2)
BILIRUB INDIRECT FLD-MCNC: 0.3 MG/DL — SIGNIFICANT CHANGE UP (ref 0.2–1)
BILIRUB SERPL-MCNC: 0.6 MG/DL — SIGNIFICANT CHANGE UP (ref 0.2–1.2)
BILIRUB SERPL-MCNC: 0.6 MG/DL — SIGNIFICANT CHANGE UP (ref 0.2–1.2)
BUN SERPL-MCNC: 31 MG/DL — HIGH (ref 7–23)
CALCIUM SERPL-MCNC: 8.9 MG/DL — SIGNIFICANT CHANGE UP (ref 8.5–10.1)
CHLORIDE SERPL-SCNC: 104 MMOL/L — SIGNIFICANT CHANGE UP (ref 96–108)
CO2 SERPL-SCNC: 30 MMOL/L — SIGNIFICANT CHANGE UP (ref 22–31)
CREAT SERPL-MCNC: 0.98 MG/DL — SIGNIFICANT CHANGE UP (ref 0.5–1.3)
CREAT SERPL-MCNC: 1.09 MG/DL — SIGNIFICANT CHANGE UP (ref 0.5–1.3)
CRP SERPL-MCNC: 10 MG/L — HIGH
GLUCOSE BLDC GLUCOMTR-MCNC: 130 MG/DL — HIGH (ref 70–99)
GLUCOSE BLDC GLUCOMTR-MCNC: 158 MG/DL — HIGH (ref 70–99)
GLUCOSE BLDC GLUCOMTR-MCNC: 161 MG/DL — HIGH (ref 70–99)
GLUCOSE BLDC GLUCOMTR-MCNC: 192 MG/DL — HIGH (ref 70–99)
GLUCOSE SERPL-MCNC: 124 MG/DL — HIGH (ref 70–99)
HAV IGM SER-ACNC: SIGNIFICANT CHANGE UP
HBV CORE IGM SER-ACNC: SIGNIFICANT CHANGE UP
HBV SURFACE AG SER-ACNC: SIGNIFICANT CHANGE UP
HCT VFR BLD CALC: 43.1 % — SIGNIFICANT CHANGE UP (ref 39–50)
HCV AB S/CO SERPL IA: 0.26 S/CO — SIGNIFICANT CHANGE UP (ref 0–0.99)
HCV AB SERPL-IMP: SIGNIFICANT CHANGE UP
HGB BLD-MCNC: 14.2 G/DL — SIGNIFICANT CHANGE UP (ref 13–17)
INR BLD: 1.27 RATIO — HIGH (ref 0.88–1.16)
MAGNESIUM SERPL-MCNC: 2.6 MG/DL — SIGNIFICANT CHANGE UP (ref 1.6–2.6)
MCHC RBC-ENTMCNC: 29.3 PG — SIGNIFICANT CHANGE UP (ref 27–34)
MCHC RBC-ENTMCNC: 32.9 GM/DL — SIGNIFICANT CHANGE UP (ref 32–36)
MCV RBC AUTO: 89 FL — SIGNIFICANT CHANGE UP (ref 80–100)
NRBC # BLD: 0 /100 WBCS — SIGNIFICANT CHANGE UP (ref 0–0)
PHOSPHATE SERPL-MCNC: 3.2 MG/DL — SIGNIFICANT CHANGE UP (ref 2.5–4.5)
PLATELET # BLD AUTO: 339 K/UL — SIGNIFICANT CHANGE UP (ref 150–400)
POTASSIUM SERPL-MCNC: 4.6 MMOL/L — SIGNIFICANT CHANGE UP (ref 3.5–5.3)
POTASSIUM SERPL-SCNC: 4.6 MMOL/L — SIGNIFICANT CHANGE UP (ref 3.5–5.3)
PROT SERPL-MCNC: 7.7 GM/DL — SIGNIFICANT CHANGE UP (ref 6–8.3)
PROT SERPL-MCNC: 7.8 GM/DL — SIGNIFICANT CHANGE UP (ref 6–8.3)
PROTHROM AB SERPL-ACNC: 14.6 SEC — HIGH (ref 10.6–13.6)
RBC # BLD: 4.84 M/UL — SIGNIFICANT CHANGE UP (ref 4.2–5.8)
RBC # FLD: 14 % — SIGNIFICANT CHANGE UP (ref 10.3–14.5)
SODIUM SERPL-SCNC: 137 MMOL/L — SIGNIFICANT CHANGE UP (ref 135–145)
WBC # BLD: 11.84 K/UL — HIGH (ref 3.8–10.5)
WBC # FLD AUTO: 11.84 K/UL — HIGH (ref 3.8–10.5)

## 2021-11-17 PROCEDURE — 99233 SBSQ HOSP IP/OBS HIGH 50: CPT

## 2021-11-17 PROCEDURE — 99232 SBSQ HOSP IP/OBS MODERATE 35: CPT

## 2021-11-17 RX ADMIN — PANTOPRAZOLE SODIUM 40 MILLIGRAM(S): 20 TABLET, DELAYED RELEASE ORAL at 05:04

## 2021-11-17 RX ADMIN — Medication 6 MILLIGRAM(S): at 05:03

## 2021-11-17 RX ADMIN — SENNA PLUS 2 TABLET(S): 8.6 TABLET ORAL at 21:06

## 2021-11-17 RX ADMIN — Medication 75 MICROGRAM(S): at 05:03

## 2021-11-17 RX ADMIN — ENOXAPARIN SODIUM 70 MILLIGRAM(S): 100 INJECTION SUBCUTANEOUS at 05:04

## 2021-11-17 RX ADMIN — ATORVASTATIN CALCIUM 20 MILLIGRAM(S): 80 TABLET, FILM COATED ORAL at 21:06

## 2021-11-17 RX ADMIN — Medication 1 SPRAY(S): at 12:15

## 2021-11-17 RX ADMIN — POLYETHYLENE GLYCOL 3350 17 GRAM(S): 17 POWDER, FOR SOLUTION ORAL at 12:15

## 2021-11-17 RX ADMIN — ENOXAPARIN SODIUM 70 MILLIGRAM(S): 100 INJECTION SUBCUTANEOUS at 18:29

## 2021-11-17 RX ADMIN — Medication 2: at 17:13

## 2021-11-17 RX ADMIN — Medication 2: at 12:11

## 2021-11-17 NOTE — PROGRESS NOTE ADULT - SUBJECTIVE AND OBJECTIVE BOX
72 yo male with history of Emphysema, HLD & hypothyroidism presented w/ acute hypoxic respiratory failure w/ severe sepsis DIRK due to COVID 19 PNA and bilateral subsegmental pulmonary embolism.     breathing improving. eating well      MEDICATIONS  (STANDING):  atorvastatin 20 milliGRAM(s) Oral at bedtime  dexAMETHasone  Injectable 6 milliGRAM(s) IV Push daily  dextrose 40% Gel 15 Gram(s) Oral once  dextrose 5%. 1000 milliLiter(s) (50 mL/Hr) IV Continuous <Continuous>  dextrose 5%. 1000 milliLiter(s) (100 mL/Hr) IV Continuous <Continuous>  dextrose 50% Injectable 25 Gram(s) IV Push once  dextrose 50% Injectable 12.5 Gram(s) IV Push once  dextrose 50% Injectable 25 Gram(s) IV Push once  enoxaparin Injectable 70 milliGRAM(s) SubCutaneous <User Schedule>  glucagon  Injectable 1 milliGRAM(s) IntraMuscular once  insulin lispro (ADMELOG) corrective regimen sliding scale   SubCutaneous three times a day before meals  insulin lispro (ADMELOG) corrective regimen sliding scale   SubCutaneous at bedtime  levothyroxine 75 MICROGram(s) Oral daily  pantoprazole    Tablet 40 milliGRAM(s) Oral before breakfast  polyethylene glycol 3350 17 Gram(s) Oral daily  senna 2 Tablet(s) Oral at bedtime  sodium chloride 0.65% Nasal 1 Spray(s) Both Nostrils daily    MEDICATIONS  (PRN):  acetaminophen     Tablet .. 650 milliGRAM(s) Oral every 4 hours PRN Temp greater or equal to 38.5C (101.3F)  ALBUTerol    90 MICROgram(s) HFA Inhaler 2 Puff(s) Inhalation every 6 hours PRN Shortness of Breath  aluminum hydroxide/magnesium hydroxide/simethicone Suspension 30 milliLiter(s) Oral every 8 hours PRN Dyspepsia  guaiFENesin Oral Liquid (Sugar-Free) 200 milliGRAM(s) Oral every 6 hours PRN Cough      Allergies    No Known Allergies    Intolerances        Vital Signs Last 24 Hrs  T(C): 36.7 (17 Nov 2021 11:00), Max: 36.7 (17 Nov 2021 11:00)  T(F): 98 (17 Nov 2021 11:00), Max: 98 (17 Nov 2021 11:00)  HR: 76 (17 Nov 2021 11:00) (66 - 82)  BP: 103/64 (17 Nov 2021 11:00) (101/61 - 106/70)  BP(mean): --  RR: 18 (17 Nov 2021 11:00) (17 - 18)  SpO2: 93% (17 Nov 2021 11:00) (89% - 99%)    PHYSICAL EXAM:  GENERAL: NAD, well-groomed, well-developed  HEAD:  Atraumatic, Normocephalic  EYES: EOMI, PERRLA   NECK: Supple   NERVOUS SYSTEM:  Alert    CHEST/LUNG: Dec BS bilaterally   HEART: Regular rate and rhythm; No murmurs, rubs, or gallops  ABDOMEN: Soft, Nontender, Nondistended; Bowel sounds present  EXTREMITIES:  No clubbing, cyanosis, or edema    LABS:                                 14.2   11.84 )-----------( 339      ( 17 Nov 2021 07:50 )             43.1     11-17    137  |  104  |  31<H>  ----------------------------<  124<H>  4.6   |  30  |  1.09    Ca    8.9      17 Nov 2021 07:50  Phos  3.2     11-17  Mg     2.6     11-17    TPro  7.8  /  Alb  2.6<L>  /  TBili  0.6  /  DBili  .27<H>  /  AST  37  /  ALT  149<H>  /  AlkPhos  368<H>  11-17    PT/INR - ( 17 Nov 2021 07:50 )   PT: 14.6 sec;   INR: 1.27 ratio         PTT - ( 15 Nov 2021 17:11 )  PTT:30.7 sec        RADIOLOGY & ADDITIONAL TESTS:    11-15-21 @ 07:01  -  11-16-21 @ 07:00  --------------------------------------------------------  IN:  Total IN: 0 mL    OUT:    Voided (mL): 500 mL  Total OUT: 500 mL    Total NET: -500 mL

## 2021-11-17 NOTE — PROGRESS NOTE ADULT - SUBJECTIVE AND OBJECTIVE BOX
INTERVAL HPI:   70 yo male with HLD, Hypothyroidism  and Emphysema per history. Got 1st dose of Moderna Vaccine on 10/04/21.  Tested positive for COVID on 11/04/21.  Came with SOB, Cough and pleuritic chest pain, found to be hypoxic. Testing showed bilateral PE, lung infiltrates.  Admitted with hypoxic Respiratory failure.    OVERNIGHT EVENTS:  Comfortable on nasal O2.    Vital Signs Last 24 Hrs  T(C): 36.7 (17 Nov 2021 11:00), Max: 36.7 (17 Nov 2021 11:00)  T(F): 98 (17 Nov 2021 11:00), Max: 98 (17 Nov 2021 11:00)  HR: 76 (17 Nov 2021 11:00) (66 - 82)  BP: 103/64 (17 Nov 2021 11:00) (101/61 - 106/70)  BP(mean): --  RR: 18 (17 Nov 2021 11:00) (17 - 18)  SpO2: 93% (17 Nov 2021 11:00) (89% - 99%)    PHYSICAL EXAM:  GEN:         Awake, responsive and comfortable.  HEENT:    Normal.    RESP:        no distress  CVS:           Regular rate and rhythm.     MEDICATIONS  (STANDING):  atorvastatin 20 milliGRAM(s) Oral at bedtime  dexAMETHasone  Injectable 6 milliGRAM(s) IV Push daily  dextrose 40% Gel 15 Gram(s) Oral once  dextrose 5%. 1000 milliLiter(s) (50 mL/Hr) IV Continuous <Continuous>  dextrose 5%. 1000 milliLiter(s) (100 mL/Hr) IV Continuous <Continuous>  dextrose 50% Injectable 25 Gram(s) IV Push once  dextrose 50% Injectable 12.5 Gram(s) IV Push once  dextrose 50% Injectable 25 Gram(s) IV Push once  enoxaparin Injectable 70 milliGRAM(s) SubCutaneous <User Schedule>  glucagon  Injectable 1 milliGRAM(s) IntraMuscular once  insulin lispro (ADMELOG) corrective regimen sliding scale   SubCutaneous three times a day before meals  insulin lispro (ADMELOG) corrective regimen sliding scale   SubCutaneous at bedtime  levothyroxine 75 MICROGram(s) Oral daily  pantoprazole    Tablet 40 milliGRAM(s) Oral before breakfast  polyethylene glycol 3350 17 Gram(s) Oral daily  senna 2 Tablet(s) Oral at bedtime  sodium chloride 0.65% Nasal 1 Spray(s) Both Nostrils daily    MEDICATIONS  (PRN):  acetaminophen     Tablet .. 650 milliGRAM(s) Oral every 4 hours PRN Temp greater or equal to 38.5C (101.3F)  ALBUTerol    90 MICROgram(s) HFA Inhaler 2 Puff(s) Inhalation every 6 hours PRN Shortness of Breath  aluminum hydroxide/magnesium hydroxide/simethicone Suspension 30 milliLiter(s) Oral every 8 hours PRN Dyspepsia  guaiFENesin Oral Liquid (Sugar-Free) 200 milliGRAM(s) Oral every 6 hours PRN Cough    LABS:                        14.2   11.84 )-----------( 339      ( 17 Nov 2021 07:50 )             43.1     11-17    137  |  104  |  31<H>  ----------------------------<  124<H>  4.6   |  30  |  1.09    Ca    8.9      17 Nov 2021 07:50  Phos  3.2     11-17  Mg     2.6     11-17    TPro  7.8  /  Alb  2.6<L>  /  TBili  0.6  /  DBili  .27<H>  /  AST  37  /  ALT  149<H>  /  AlkPhos  368<H>  11-17    PT/INR - ( 17 Nov 2021 07:50 )   PT: 14.6 sec;   INR: 1.27 ratio      PTT - ( 15 Nov 2021 17:11 )  PTT:30.7 sec  11-10 @ 22:46  pH: 7.45  pCO2: 34  pO2: 59  SaO2: 89  ASSESSMENT AND PLAN:  ·	Acute hypoxic Respiratory failure.  ·	COVID Pneumonia.  ·	Bilateral PE.  ·	Hypothyroidism.  ·	HLD.  ·	Anemia.    Oxygenation improving, spo2 93% on nasal O2.  On full anticoagulation and dexamethasone.

## 2021-11-17 NOTE — DIETITIAN INITIAL EVALUATION ADULT. - PERTINENT MEDS FT
MEDICATIONS  (STANDING):  atorvastatin 20 milliGRAM(s) Oral at bedtime  dexAMETHasone  Injectable 6 milliGRAM(s) IV Push daily  dextrose 40% Gel 15 Gram(s) Oral once  dextrose 5%. 1000 milliLiter(s) (50 mL/Hr) IV Continuous <Continuous>  dextrose 5%. 1000 milliLiter(s) (100 mL/Hr) IV Continuous <Continuous>  dextrose 50% Injectable 25 Gram(s) IV Push once  dextrose 50% Injectable 12.5 Gram(s) IV Push once  dextrose 50% Injectable 25 Gram(s) IV Push once  enoxaparin Injectable 70 milliGRAM(s) SubCutaneous <User Schedule>  glucagon  Injectable 1 milliGRAM(s) IntraMuscular once  insulin lispro (ADMELOG) corrective regimen sliding scale   SubCutaneous three times a day before meals  insulin lispro (ADMELOG) corrective regimen sliding scale   SubCutaneous at bedtime  levothyroxine 75 MICROGram(s) Oral daily  pantoprazole    Tablet 40 milliGRAM(s) Oral before breakfast  polyethylene glycol 3350 17 Gram(s) Oral daily  senna 2 Tablet(s) Oral at bedtime  sodium chloride 0.65% Nasal 1 Spray(s) Both Nostrils daily    MEDICATIONS  (PRN):  acetaminophen     Tablet .. 650 milliGRAM(s) Oral every 4 hours PRN Temp greater or equal to 38.5C (101.3F)  ALBUTerol    90 MICROgram(s) HFA Inhaler 2 Puff(s) Inhalation every 6 hours PRN Shortness of Breath  aluminum hydroxide/magnesium hydroxide/simethicone Suspension 30 milliLiter(s) Oral every 8 hours PRN Dyspepsia  guaiFENesin Oral Liquid (Sugar-Free) 200 milliGRAM(s) Oral every 6 hours PRN Cough

## 2021-11-17 NOTE — DIETITIAN INITIAL EVALUATION ADULT. - PERTINENT LABORATORY DATA
11-17 Na137 mmol/L Glu 124 mg/dL<H> K+ 4.6 mmol/L Cr  1.09 mg/dL BUN 31 mg/dL<H> 11-17 Phos 3.2 mg/dL 11-17 Alb 2.6 g/dL<L>    11-11 HbA1c 6.0% (indicates prediabetes)

## 2021-11-17 NOTE — PROGRESS NOTE ADULT - SUBJECTIVE AND OBJECTIVE BOX
ARTHUR ZAZUETA  MRN-[id number]    Follow Up:  COVID 19    Interval History: The pt was seen and examined earlier, [place] speaking, [facility] telephone  used, pt is eager to go home. The pt is on 6L NC now with O2 SAT 93%, states that he is feeling better and has no pain anywhere. The pt is afebrile, WBC is about the same.     PAST MEDICAL & SURGICAL HISTORY:  Hypothyroidism    Hyperlipidemia    Emphysema lung    No significant past surgical history        ROS:    [ ] Unobtainable because:  [x ] All other systems negative    Constitutional: no fever, no chills  Head: no trauma  Eyes: no vision changes, no eye pain  ENT:  no sore throat, no rhinorrhea  Cardiovascular:  no chest pain, no palpitation  Respiratory:  no SOB with supplemental O2, no cough  GI:  no abd pain, no vomiting, no diarrhea  urinary: no dysuria, no hematuria, no flank pain  musculoskeletal:  no joint pain, no joint swelling  skin:  no rash  neurology:  no headache, no seizure, no change in mental status  psych: no anxiety, no depression         Allergies  No Known Allergies        ANTIMICROBIALS:      OTHER MEDS:  acetaminophen     Tablet .. 650 milliGRAM(s) Oral every 4 hours PRN  ALBUTerol    90 MICROgram(s) HFA Inhaler 2 Puff(s) Inhalation every 6 hours PRN  aluminum hydroxide/magnesium hydroxide/simethicone Suspension 30 milliLiter(s) Oral every 8 hours PRN  atorvastatin 20 milliGRAM(s) Oral at bedtime  dexAMETHasone  Injectable 6 milliGRAM(s) IV Push daily  dextrose 40% Gel 15 Gram(s) Oral once  dextrose 5%. 1000 milliLiter(s) IV Continuous <Continuous>  dextrose 5%. 1000 milliLiter(s) IV Continuous <Continuous>  dextrose 50% Injectable 25 Gram(s) IV Push once  dextrose 50% Injectable 12.5 Gram(s) IV Push once  dextrose 50% Injectable 25 Gram(s) IV Push once  enoxaparin Injectable 70 milliGRAM(s) SubCutaneous <User Schedule>  glucagon  Injectable 1 milliGRAM(s) IntraMuscular once  guaiFENesin Oral Liquid (Sugar-Free) 200 milliGRAM(s) Oral every 6 hours PRN  insulin lispro (ADMELOG) corrective regimen sliding scale   SubCutaneous three times a day before meals  insulin lispro (ADMELOG) corrective regimen sliding scale   SubCutaneous at bedtime  levothyroxine 75 MICROGram(s) Oral daily  pantoprazole    Tablet 40 milliGRAM(s) Oral before breakfast  polyethylene glycol 3350 17 Gram(s) Oral daily  senna 2 Tablet(s) Oral at bedtime  sodium chloride 0.65% Nasal 1 Spray(s) Both Nostrils daily      Vital Signs Last 24 Hrs  T(C): 36.7 (17 Nov 2021 11:00), Max: 36.7 (17 Nov 2021 11:00)  T(F): 98 (17 Nov 2021 11:00), Max: 98 (17 Nov 2021 11:00)  HR: 76 (17 Nov 2021 11:00) (66 - 82)  BP: 103/64 (17 Nov 2021 11:00) (101/61 - 106/70)  BP(mean): --  RR: 18 (17 Nov 2021 11:00) (17 - 18)  SpO2: 93% (17 Nov 2021 11:00) (89% - 99%)    Physical Exam:  General:    NAD,  non toxic, on 6 L NC  Head: atraumatic, normocephalic  Eye: normal sclera and conjunctiva  ENT:    no oral lesions, neck supple  Cardio:     regular S1, S2,  no murmur  Respiratory:  crackles at bilateral bases,    no wheezing  abd:     soft,   BS +,   no tenderness  :   no CVAT,  no suprapubic tenderness,   no  monzon  Musculoskeletal:   no joint swelling,   no edema  vascular: no central lines, +PIV   Skin:    no rash  Neurologic:     no focal deficit  psych: normal affect    WBC Count: 11.84 K/uL (11-17 @ 07:50)  WBC Count: 11.35 K/uL (11-16 @ 07:27)  WBC Count: 9.72 K/uL (11-15 @ 08:03)  WBC Count: 11.93 K/uL (11-14 @ 06:36)  WBC Count: 9.93 K/uL (11-13 @ 07:20)  WBC Count: 14.82 K/uL (11-12 @ 07:16)  WBC Count: 7.62 K/uL (11-11 @ 07:01)                            14.2   11.84 )-----------( 339      ( 17 Nov 2021 07:50 )             43.1       11-17    137  |  104  |  31<H>  ----------------------------<  124<H>  4.6   |  30  |  1.09    Ca    8.9      17 Nov 2021 07:50  Phos  3.2     11-17  Mg     2.6     11-17    TPro  7.8  /  Alb  2.6<L>  /  TBili  0.6  /  DBili  .27<H>  /  AST  37  /  ALT  149<H>  /  AlkPhos  368<H>  11-17          Creatinine Trend: 1.09<--, 0.91<--, 0.88<--, 0.81<--, 0.74<--, 0.72<--  Lactate, Blood: 1.4 mmol/L (11-16-21 @ 07:27)      MICROBIOLOGY:  v  .Blood  11-11-21   No Growth Final  --  --                C-Reactive Protein, Serum: 10 (11-17)  C-Reactive Protein, Serum: 14 (11-16)    Ferritin, Serum: 1075 (11-16)      D-Dimer Assay, Quantitative: 2223 (11-16)  D-Dimer Assay, Quantitative: 3224 (11-13)  D-Dimer Assay, Quantitative: 40216 (11-10)        RADIOLOGY:

## 2021-11-17 NOTE — PROGRESS NOTE ADULT - ASSESSMENT
72 yo male with history of HLD, hypothyroidism who presents from home after testing positive for COVID on 11/4.   His symptoms started a week before Halloween   Presented hypoxic requiring 4L nasal cannula and was quicklly moved to HFNC where he feels more comfortable and saturation is better.   CTA (I personally reviewed) on admission showed bilateral subsegmental PE as well as GGO bilaterally and patient started on heparin drip.     11/12: still on HFNC, doing better, remains on heparin drip mil d transaminitis    11/15: no fevers, leukocytosis normalized, remains on HFNC, Cr ok, transaminitis is better, the pt completed 5 days course of remdesivir, need 4 more days of decadron - order re-instated.   11/16: titrating oxygen, feels well, vitals stable other then hypoxia,   11/17: no fevers, WBC 11.84, Cr ok, LFTs better, O2 demand decreased, now on 6L NC with O2 SAT 93% during my exam, comfortable, s/p course of remdesivir, on decadron. All pt's questions answered to the best of my ability.       #acute hypoxemic resp failure 2/2 COVID 19 pneumonia  #pulmonary embolism    #high serum lactate  #Gerd    #COVID  Monitor clinically.  Monitor Oxygenation  O2 supplementation.  Remdesivir 5 days course complete   Monitor CBC, CMP daily  Ferritin, CRP, and D dimer q 48 hrs.  IV Dexamethasone 6mg q24hrs x10 days  full dose Anticoagulation for pulmonary embolism   Treatment options are limited-this as well as limitations of data discussed with pt.  Monitor for any bacterial superinfection/complications.  discussed tocilizumab with patient and daughter: since he has been sick for more then 2 weeks and the benefit is small with a infectious risks we came to the conclusion of not giving tocilizumab   This tx plan was formulated utilizing my clinical judgement, currently available local/regional anecdotal information, organizational treatment recommendations with COVID-19 specific considerations given rapidly changing information available.    chest PT, proning as tolerated   please continue to titrate down the oxygen       #PE  AC per medicine  continue full dose AC with lovenox   wean oxygen   consider Transthoracic Echocardiogram     #lactate   was elevated on admission likely related to hypoxia  improved with oxygen and fluids    #GERD  patient takes omeprazole at home  he is on high dose steroids which he feels is worsening his gastritis  PPI-Protonix has been started      Discussed with Dr. Christianson.

## 2021-11-17 NOTE — PROGRESS NOTE ADULT - ASSESSMENT
70 yo male with history of Emphysema, HLD & hypothyroidism presented w/ acute hypoxic respiratory failure w/ severe sepsis DIRK due to COVID 19 PNA and bilateral subsegmental pulmonary embolism.      COVID19 PNA  - CT showed multifocal dense and ground-glass consolidations in a subpleural location and bibasilar predominance w/ adenopathy    - c/w Oxygen, patient is now off high flow NC an stable on 6L  - c/w dexamethasone  - s/p Remdesivir course   - ID following and noted that the patient did not meet criteria for Toci    - pulm note read and appreciated   - incentive spirometer; instructed on use  - proning encouraged  - OOB to chair when awake  - albuterol PRN  - lactic acidosis resolved   - c/w albuterol    Fusiform aneurysm of the ascending aorta measuring up to 4.4 cm  - will need vascular eval once COVID PNA has resolved  - BP is well controlled off meds    Transaminitis  - may due to COVID and sepsis  - f/u hepatitis panel    PreDM   - Hgb A1C is 6  - will make patient aware & [place] on diet/ lifestyle modifications  - c/w ISS    Bilateral PE  - CTA showed segmental and subsegmental pulmonary emboli in all lobes  - changed heparin drip to Lovenox. will consider changing to noac soon   - check Echo     Gastritis  - c/w Protonix w/ PRN Maalox    Hypothyroidism   - c/w levothyroxine    Hyperlipidemia  - c/w Atorvastatin - will hold if LFTs do not improve    Constipation   - c/w Miralax and Senna    Prophylaxis:  DVT: Lovenox  GI: Protonix

## 2021-11-18 LAB
ALBUMIN SERPL ELPH-MCNC: 2.4 G/DL — LOW (ref 3.3–5)
ALP SERPL-CCNC: 310 U/L — HIGH (ref 40–120)
ALT FLD-CCNC: 114 U/L — HIGH (ref 12–78)
ANION GAP SERPL CALC-SCNC: 4 MMOL/L — LOW (ref 5–17)
AST SERPL-CCNC: 25 U/L — SIGNIFICANT CHANGE UP (ref 15–37)
BILIRUB SERPL-MCNC: 0.7 MG/DL — SIGNIFICANT CHANGE UP (ref 0.2–1.2)
BUN SERPL-MCNC: 34 MG/DL — HIGH (ref 7–23)
CALCIUM SERPL-MCNC: 8.5 MG/DL — SIGNIFICANT CHANGE UP (ref 8.5–10.1)
CHLORIDE SERPL-SCNC: 103 MMOL/L — SIGNIFICANT CHANGE UP (ref 96–108)
CO2 SERPL-SCNC: 29 MMOL/L — SIGNIFICANT CHANGE UP (ref 22–31)
CREAT SERPL-MCNC: 1.14 MG/DL — SIGNIFICANT CHANGE UP (ref 0.5–1.3)
GLUCOSE BLDC GLUCOMTR-MCNC: 145 MG/DL — HIGH (ref 70–99)
GLUCOSE BLDC GLUCOMTR-MCNC: 145 MG/DL — HIGH (ref 70–99)
GLUCOSE BLDC GLUCOMTR-MCNC: 181 MG/DL — HIGH (ref 70–99)
GLUCOSE BLDC GLUCOMTR-MCNC: 186 MG/DL — HIGH (ref 70–99)
GLUCOSE SERPL-MCNC: 102 MG/DL — HIGH (ref 70–99)
HCT VFR BLD CALC: 42 % — SIGNIFICANT CHANGE UP (ref 39–50)
HGB BLD-MCNC: 13.8 G/DL — SIGNIFICANT CHANGE UP (ref 13–17)
INR BLD: 1.22 RATIO — HIGH (ref 0.88–1.16)
MCHC RBC-ENTMCNC: 29.3 PG — SIGNIFICANT CHANGE UP (ref 27–34)
MCHC RBC-ENTMCNC: 32.9 GM/DL — SIGNIFICANT CHANGE UP (ref 32–36)
MCV RBC AUTO: 89.2 FL — SIGNIFICANT CHANGE UP (ref 80–100)
NRBC # BLD: 0 /100 WBCS — SIGNIFICANT CHANGE UP (ref 0–0)
PLATELET # BLD AUTO: 340 K/UL — SIGNIFICANT CHANGE UP (ref 150–400)
POTASSIUM SERPL-MCNC: 4.6 MMOL/L — SIGNIFICANT CHANGE UP (ref 3.5–5.3)
POTASSIUM SERPL-SCNC: 4.6 MMOL/L — SIGNIFICANT CHANGE UP (ref 3.5–5.3)
PROT SERPL-MCNC: 7.3 GM/DL — SIGNIFICANT CHANGE UP (ref 6–8.3)
PROTHROM AB SERPL-ACNC: 14 SEC — HIGH (ref 10.6–13.6)
RBC # BLD: 4.71 M/UL — SIGNIFICANT CHANGE UP (ref 4.2–5.8)
RBC # FLD: 14.2 % — SIGNIFICANT CHANGE UP (ref 10.3–14.5)
SODIUM SERPL-SCNC: 136 MMOL/L — SIGNIFICANT CHANGE UP (ref 135–145)
WBC # BLD: 11.59 K/UL — HIGH (ref 3.8–10.5)
WBC # FLD AUTO: 11.59 K/UL — HIGH (ref 3.8–10.5)

## 2021-11-18 PROCEDURE — 99232 SBSQ HOSP IP/OBS MODERATE 35: CPT

## 2021-11-18 RX ORDER — APIXABAN 2.5 MG/1
5 TABLET, FILM COATED ORAL EVERY 12 HOURS
Refills: 0 | Status: DISCONTINUED | OUTPATIENT
Start: 2021-11-18 | End: 2021-11-20

## 2021-11-18 RX ADMIN — ATORVASTATIN CALCIUM 20 MILLIGRAM(S): 80 TABLET, FILM COATED ORAL at 21:33

## 2021-11-18 RX ADMIN — Medication 1 SPRAY(S): at 11:21

## 2021-11-18 RX ADMIN — SENNA PLUS 2 TABLET(S): 8.6 TABLET ORAL at 21:33

## 2021-11-18 RX ADMIN — POLYETHYLENE GLYCOL 3350 17 GRAM(S): 17 POWDER, FOR SOLUTION ORAL at 11:21

## 2021-11-18 RX ADMIN — Medication 2: at 11:20

## 2021-11-18 RX ADMIN — ENOXAPARIN SODIUM 70 MILLIGRAM(S): 100 INJECTION SUBCUTANEOUS at 06:01

## 2021-11-18 RX ADMIN — APIXABAN 5 MILLIGRAM(S): 2.5 TABLET, FILM COATED ORAL at 17:58

## 2021-11-18 RX ADMIN — PANTOPRAZOLE SODIUM 40 MILLIGRAM(S): 20 TABLET, DELAYED RELEASE ORAL at 06:01

## 2021-11-18 RX ADMIN — Medication 6 MILLIGRAM(S): at 06:01

## 2021-11-18 RX ADMIN — Medication 75 MICROGRAM(S): at 06:01

## 2021-11-18 NOTE — PROGRESS NOTE ADULT - SUBJECTIVE AND OBJECTIVE BOX
INTERVAL HPI:  70 yo male with HLD, Hypothyroidism  and Emphysema per history. Got 1st dose of Moderna Vaccine on 10/04/21.  Tested positive for COVID on 11/04/21.  Came with SOB, Cough and pleuritic chest pain, found to be hypoxic. Testing showed bilateral PE, lung infiltrates.  Admitted with hypoxic Respiratory failure.    OVERNIGHT EVENTS:  Awake and comfortable in bed.    Vital Signs Last 24 Hrs  T(C): 36.8 (18 Nov 2021 11:54), Max: 37 (17 Nov 2021 23:43)  T(F): 98.2 (18 Nov 2021 11:54), Max: 98.6 (17 Nov 2021 23:43)  HR: 75 (18 Nov 2021 11:54) (69 - 75)  BP: 103/61 (18 Nov 2021 11:54) (91/52 - 109/62)  BP(mean): --  RR: 19 (18 Nov 2021 11:54) (17 - 19)  SpO2: 91% (18 Nov 2021 11:54) (90% - 96%)    PHYSICAL EXAM:  GEN:         Awake, responsive and comfortable.  HEENT:    Normal.    RESP:       no distress  CVS:          Regular rate and rhythm.   ABD:         Soft, non-tender, non-distended;     MEDICATIONS  (STANDING):  atorvastatin 20 milliGRAM(s) Oral at bedtime  dexAMETHasone  Injectable 6 milliGRAM(s) IV Push daily  dextrose 40% Gel 15 Gram(s) Oral once  dextrose 5%. 1000 milliLiter(s) (50 mL/Hr) IV Continuous <Continuous>  dextrose 5%. 1000 milliLiter(s) (100 mL/Hr) IV Continuous <Continuous>  dextrose 50% Injectable 25 Gram(s) IV Push once  dextrose 50% Injectable 25 Gram(s) IV Push once  dextrose 50% Injectable 12.5 Gram(s) IV Push once  enoxaparin Injectable 70 milliGRAM(s) SubCutaneous <User Schedule>  glucagon  Injectable 1 milliGRAM(s) IntraMuscular once  insulin lispro (ADMELOG) corrective regimen sliding scale   SubCutaneous at bedtime  insulin lispro (ADMELOG) corrective regimen sliding scale   SubCutaneous three times a day before meals  levothyroxine 75 MICROGram(s) Oral daily  pantoprazole    Tablet 40 milliGRAM(s) Oral before breakfast  polyethylene glycol 3350 17 Gram(s) Oral daily  senna 2 Tablet(s) Oral at bedtime  sodium chloride 0.65% Nasal 1 Spray(s) Both Nostrils daily    MEDICATIONS  (PRN):  acetaminophen     Tablet .. 650 milliGRAM(s) Oral every 4 hours PRN Temp greater or equal to 38.5C (101.3F)  ALBUTerol    90 MICROgram(s) HFA Inhaler 2 Puff(s) Inhalation every 6 hours PRN Shortness of Breath  aluminum hydroxide/magnesium hydroxide/simethicone Suspension 30 milliLiter(s) Oral every 8 hours PRN Dyspepsia  guaiFENesin Oral Liquid (Sugar-Free) 200 milliGRAM(s) Oral every 6 hours PRN Cough    LABS:                        13.8   11.59 )-----------( 340      ( 18 Nov 2021 07:34 )             42.0     11-18    136  |  103  |  34<H>  ----------------------------<  102<H>  4.6   |  29  |  1.14    Ca    8.5      18 Nov 2021 07:34  Phos  3.2     11-17  Mg     2.6     11-17    TPro  7.3  /  Alb  2.4<L>  /  TBili  0.7  /  DBili  x   /  AST  25  /  ALT  114<H>  /  AlkPhos  310<H>  11-18    PT/INR - ( 18 Nov 2021 07:34 )   PT: 14.0 sec;   INR: 1.22 ratio       ASSESSMENT AND PLAN:  ·	Acute hypoxic Respiratory failure.  ·	COVID Pneumonia.  ·	Bilateral PE.  ·	Hypothyroidism.  ·	HLD.  ·	Anemia.    SPO2 91% on room air.  Continue Dexamethasone and full dose Lovenox.  Completed Remdesivir.

## 2021-11-18 NOTE — PROGRESS NOTE ADULT - ASSESSMENT
72 yo male with history of Emphysema, HLD & hypothyroidism presented w/ acute hypoxic respiratory failure w/ severe sepsis DIRK due to COVID 19 PNA and bilateral subsegmental pulmonary embolism.      COVID19 PNA  - CT showed multifocal dense and ground-glass consolidations in a subpleural location and bibasilar predominance w/ adenopathy    - c/w Oxygen, patient is now off high flow NC an stable on 6L and continues to ween well. still hypoxic on ra   - c/w dexamethasone  - s/p Remdesivir course   - ID following and noted that the patient did not meet criteria for Toci    - pulm note read and appreciated   - incentive spirometer; instructed on use  - proning encouraged  - OOB to chair when awake  - albuterol PRN  - lactic acidosis resolved   - c/w albuterol  - pt consult     Fusiform aneurysm of the ascending aorta measuring up to 4.4 cm  - will need vascular eval once COVID PNA has resolved  - BP is well controlled off meds    Transaminitis  - may due to COVID and sepsis  - f/u hepatitis panel    PreDM   - Hgb A1C is 6  - will make patient aware & [place] on diet/ lifestyle modifications  - c/w ISS    Bilateral PE  - CTA showed segmental and subsegmental pulmonary emboli in all lobes  - changed heparin drip to Lovenox. and now will change to elquis   - check Echo for rh strain     Gastritis  - c/w Protonix w/ PRN Maalox    Hypothyroidism   - c/w levothyroxine    Hyperlipidemia  - c/w Atorvastatin - will hold if LFTs do not improve    Constipation   - c/w Miralax and Senna    Prophylaxis:  DVT: Lovenox  GI: Protonix

## 2021-11-18 NOTE — PHYSICAL THERAPY INITIAL EVALUATION ADULT - LIVES WITH, PROFILE
Pt states he lives in a pvt house with her daughter and 3 other family members, 2 steps to enter with rails and 1flight to go to the 2nd floor when he sleeps.

## 2021-11-18 NOTE — PROGRESS NOTE ADULT - SUBJECTIVE AND OBJECTIVE BOX
72 yo male with history of Emphysema, HLD & hypothyroidism presented w/ acute hypoxic respiratory failure w/ severe sepsis DIRK due to COVID 19 PNA and bilateral subsegmental pulmonary embolism.     breathing improving. eating well      MEDICATIONS  (STANDING):  atorvastatin 20 milliGRAM(s) Oral at bedtime  dexAMETHasone  Injectable 6 milliGRAM(s) IV Push daily  dextrose 40% Gel 15 Gram(s) Oral once  dextrose 5%. 1000 milliLiter(s) (50 mL/Hr) IV Continuous <Continuous>  dextrose 5%. 1000 milliLiter(s) (100 mL/Hr) IV Continuous <Continuous>  dextrose 50% Injectable 25 Gram(s) IV Push once  dextrose 50% Injectable 12.5 Gram(s) IV Push once  dextrose 50% Injectable 25 Gram(s) IV Push once  enoxaparin Injectable 70 milliGRAM(s) SubCutaneous <User Schedule>  glucagon  Injectable 1 milliGRAM(s) IntraMuscular once  insulin lispro (ADMELOG) corrective regimen sliding scale   SubCutaneous three times a day before meals  insulin lispro (ADMELOG) corrective regimen sliding scale   SubCutaneous at bedtime  levothyroxine 75 MICROGram(s) Oral daily  pantoprazole    Tablet 40 milliGRAM(s) Oral before breakfast  polyethylene glycol 3350 17 Gram(s) Oral daily  senna 2 Tablet(s) Oral at bedtime  sodium chloride 0.65% Nasal 1 Spray(s) Both Nostrils daily    MEDICATIONS  (PRN):  acetaminophen     Tablet .. 650 milliGRAM(s) Oral every 4 hours PRN Temp greater or equal to 38.5C (101.3F)  ALBUTerol    90 MICROgram(s) HFA Inhaler 2 Puff(s) Inhalation every 6 hours PRN Shortness of Breath  aluminum hydroxide/magnesium hydroxide/simethicone Suspension 30 milliLiter(s) Oral every 8 hours PRN Dyspepsia  guaiFENesin Oral Liquid (Sugar-Free) 200 milliGRAM(s) Oral every 6 hours PRN Cough      Allergies    No Known Allergies    Intolerances        Vital Signs Last 24 Hrs  T(C): 36.8 (18 Nov 2021 11:54), Max: 37 (17 Nov 2021 23:43)  T(F): 98.2 (18 Nov 2021 11:54), Max: 98.6 (17 Nov 2021 23:43)  HR: 75 (18 Nov 2021 11:54) (69 - 75)  BP: 103/61 (18 Nov 2021 11:54) (91/52 - 109/62)  BP(mean): --  RR: 19 (18 Nov 2021 11:54) (17 - 19)  SpO2: 91% (18 Nov 2021 11:54) (90% - 96%)      PHYSICAL EXAM:  GENERAL: NAD, well-groomed, well-developed  HEAD:  Atraumatic, Normocephalic  EYES: EOMI, PERRLA   NECK: Supple   NERVOUS SYSTEM:  Alert    CHEST/LUNG: Dec BS bilaterally   HEART: Regular rate and rhythm; No murmurs, rubs, or gallops  ABDOMEN: Soft, Nontender, Nondistended; Bowel sounds present  EXTREMITIES:  No clubbing, cyanosis, or edema    LABS:                                             13.8   11.59 )-----------( 340      ( 18 Nov 2021 07:34 )             42.0     11-18    136  |  103  |  34<H>  ----------------------------<  102<H>  4.6   |  29  |  1.14    Ca    8.5      18 Nov 2021 07:34  Phos  3.2     11-17  Mg     2.6     11-17    TPro  7.3  /  Alb  2.4<L>  /  TBili  0.7  /  DBili  x   /  AST  25  /  ALT  114<H>  /  AlkPhos  310<H>  11-18    PT/INR - ( 18 Nov 2021 07:34 )   PT: 14.0 sec;   INR: 1.22 ratio                 RADIOLOGY & ADDITIONAL TESTS:    11-15-21 @ 07:01  -  11-16-21 @ 07:00  --------------------------------------------------------  IN:  Total IN: 0 mL    OUT:    Voided (mL): 500 mL  Total OUT: 500 mL    Total NET: -500 mL

## 2021-11-18 NOTE — PROGRESS NOTE ADULT - ASSESSMENT
72 yo male with history of HLD, hypothyroidism who presents from home after testing positive for COVID on 11/4.   His symptoms started a week before Halloween   Presented hypoxic requiring 4L nasal cannula and was quicklly moved to HFNC where he feels more comfortable and saturation is better.   CTA (I personally reviewed) on admission showed bilateral subsegmental PE as well as GGO bilaterally and patient started on heparin drip.     11/12: still on HFNC, doing better, remains on heparin drip mil d transaminitis    11/15: no fevers, leukocytosis normalized, remains on HFNC, Cr ok, transaminitis is better, the pt completed 5 days course of remdesivir, need 4 more days of decadron - order re-instated.   11/16: titrating oxygen, feels well, vitals stable other then hypoxia,   11/17: no fevers, WBC 11.84, Cr ok, LFTs better, O2 demand decreased, now on 6L NC with O2 SAT 93% during my exam, comfortable, s/p course of remdesivir, on decadron. All pt's questions answered to the best of my ability.   Attending addendum:  [employer] [place] [phone]    discussed in detail with patient the nature of his illness and why he is still in the hospital. He did not seem to understand that despite him feeling well and having no symptoms that it wouldn't be safe to discharge him as he is requiring 6L of humidified oxygen with difficulty in titrating down. After our discussion he understood the need to stay while the entire team works on titrating down his oxygen: namely by providing chest PT, encouraging incentive spirometry, proning, oxygen delivery via nasal cannula, PT, anticoagulation for his pulmonary embolism and dexamethasone.    11/18: remains on NC, 5L during my exam with O2 SAT 92%, comfortable, no fevers, WBC with no change, s/p course of remdesivir, on Decadron, on AC for pulmonary embolism.       #acute hypoxemic resp failure 2/2 COVID 19 pneumonia  #pulmonary embolism    #high serum lactate  #Gerd    #COVID  Monitor clinically.  Monitor Oxygenation  O2 supplementation.  Remdesivir 5 days course complete   Monitor CBC, CMP daily  Ferritin, CRP, and D dimer q 48 hrs.  IV Dexamethasone 6mg q24hrs x10 days  full dose Anticoagulation for pulmonary embolism   Treatment options are limited-this as well as limitations of data discussed with pt.  Monitor for any bacterial superinfection/complications.  discussed tocilizumab with patient and daughter: since he has been sick for more then 2 weeks and the benefit is small with a infectious risks we came to the conclusion of not giving tocilizumab   This tx plan was formulated utilizing my clinical judgement, currently available local/regional anecdotal information, organizational treatment recommendations with COVID-19 specific considerations given rapidly changing information available.    chest PT, proning as tolerated   please continue to titrate down the oxygen       #PE  AC per medicine  continue full dose AC with lovenox   wean oxygen   consider Transthoracic Echocardiogram - pending     #lactate   was elevated on admission likely related to hypoxia  improved with oxygen and fluids    #GERD  patient takes omeprazole at home  he is on high dose steroids which he feels is worsening his gastritis  PPI-Protonix has been started

## 2021-11-18 NOTE — PROGRESS NOTE ADULT - SUBJECTIVE AND OBJECTIVE BOX
ARTHUR ZAZUETA  MRN-[id number]    Follow Up:  COVID 19    Interval History: The pt was seen and examined earlier, no distress, remains on NC, comfortable. The pt is afebrile, no change in WBC.     PAST MEDICAL & SURGICAL HISTORY:  Hypothyroidism    Hyperlipidemia    Emphysema lung    No significant past surgical history        ROS:    [ ] Unobtainable because:  [x ] All other systems negative    Constitutional: no fever, no chills  Head: no trauma  Eyes: no vision changes, no eye pain  ENT:  no sore throat, no rhinorrhea  Cardiovascular:  no chest pain, no palpitation  Respiratory:  no SOB, no cough  GI:  no abd pain, no vomiting, no diarrhea  urinary: no dysuria, no hematuria, no flank pain  musculoskeletal:  no joint pain, no joint swelling  skin:  no rash  neurology:  no headache, no seizure, no change in mental status  psych: no anxiety, no depression         Allergies  No Known Allergies        ANTIMICROBIALS:      OTHER MEDS:  acetaminophen     Tablet .. 650 milliGRAM(s) Oral every 4 hours PRN  ALBUTerol    90 MICROgram(s) HFA Inhaler 2 Puff(s) Inhalation every 6 hours PRN  aluminum hydroxide/magnesium hydroxide/simethicone Suspension 30 milliLiter(s) Oral every 8 hours PRN  apixaban 5 milliGRAM(s) Oral every 12 hours  atorvastatin 20 milliGRAM(s) Oral at bedtime  dexAMETHasone  Injectable 6 milliGRAM(s) IV Push daily  dextrose 40% Gel 15 Gram(s) Oral once  dextrose 5%. 1000 milliLiter(s) IV Continuous <Continuous>  dextrose 5%. 1000 milliLiter(s) IV Continuous <Continuous>  dextrose 50% Injectable 25 Gram(s) IV Push once  dextrose 50% Injectable 25 Gram(s) IV Push once  dextrose 50% Injectable 12.5 Gram(s) IV Push once  glucagon  Injectable 1 milliGRAM(s) IntraMuscular once  guaiFENesin Oral Liquid (Sugar-Free) 200 milliGRAM(s) Oral every 6 hours PRN  insulin lispro (ADMELOG) corrective regimen sliding scale   SubCutaneous at bedtime  insulin lispro (ADMELOG) corrective regimen sliding scale   SubCutaneous three times a day before meals  levothyroxine 75 MICROGram(s) Oral daily  pantoprazole    Tablet 40 milliGRAM(s) Oral before breakfast  polyethylene glycol 3350 17 Gram(s) Oral daily  senna 2 Tablet(s) Oral at bedtime  sodium chloride 0.65% Nasal 1 Spray(s) Both Nostrils daily      Vital Signs Last 24 Hrs  T(C): 36.5 (18 Nov 2021 15:58), Max: 37 (17 Nov 2021 23:43)  T(F): 97.7 (18 Nov 2021 15:58), Max: 98.6 (17 Nov 2021 23:43)  HR: 76 (18 Nov 2021 15:58) (69 - 76)  BP: 106/70 (18 Nov 2021 15:58) (91/52 - 109/62)  BP(mean): --  RR: 18 (18 Nov 2021 15:58) (17 - 19)  SpO2: 94% (18 Nov 2021 15:58) (90% - 96%)    Physical Exam:  General:    NAD,  non toxic, on 5 L NC  Head: atraumatic, normocephalic  Eye: normal sclera and conjunctiva  ENT:    no oral lesions, neck supple  Cardio:     regular S1, S2,  no murmur  Respiratory:  crackles bilaterally,    no wheezing  abd:     soft,   BS +,   no tenderness  :   no CVAT,  no suprapubic tenderness,   no  monzon  Musculoskeletal:   no joint swelling,   no edema  vascular: no central lines, +PIV   Skin:    no rash  Neurologic:     no focal deficit  psych: normal affect    WBC Count: 11.59 K/uL (11-18 @ 07:34)  WBC Count: 11.84 K/uL (11-17 @ 07:50)  WBC Count: 11.35 K/uL (11-16 @ 07:27)  WBC Count: 9.72 K/uL (11-15 @ 08:03)  WBC Count: 11.93 K/uL (11-14 @ 06:36)  WBC Count: 9.93 K/uL (11-13 @ 07:20)  WBC Count: 14.82 K/uL (11-12 @ 07:16)                            13.8   11.59 )-----------( 340      ( 18 Nov 2021 07:34 )             42.0       11-18    136  |  103  |  34<H>  ----------------------------<  102<H>  4.6   |  29  |  1.14    Ca    8.5      18 Nov 2021 07:34  Phos  3.2     11-17  Mg     2.6     11-17    TPro  7.3  /  Alb  2.4<L>  /  TBili  0.7  /  DBili  x   /  AST  25  /  ALT  114<H>  /  AlkPhos  310<H>  11-18          Creatinine Trend: 1.14<--, 1.09<--, 0.91<--, 0.88<--, 0.81<--, 0.74<--      MICROBIOLOGY:  v  .Blood  11-11-21   No Growth Final  --  --      C-Reactive Protein, Serum: 10 (11-17)  C-Reactive Protein, Serum: 14 (11-16)    Ferritin, Serum: 1075 (11-16)      D-Dimer Assay, Quantitative: 2223 (11-16)  D-Dimer Assay, Quantitative: 3224 (11-13)  D-Dimer Assay, Quantitative: 45087 (11-10)        RADIOLOGY:

## 2021-11-18 NOTE — PHYSICAL THERAPY INITIAL EVALUATION ADULT - GENERAL OBSERVATIONS, REHAB EVAL
Pt is alert, oriented x 4, follow directions when spoken in Mong[place]ian language, on NC with humidification at 5 LPM.

## 2021-11-18 NOTE — PHYSICAL THERAPY INITIAL EVALUATION ADULT - STRENGTHENING, PT EVAL
Improve strength in the extremities to 5/5 , general endurance to good  and be able to perform functional tasks-bed mobility, sitting, standing, transfers and ambulate in a safe manner with or without  assistive device and prevent falls.

## 2021-11-18 NOTE — PHYSICAL THERAPY INITIAL EVALUATION ADULT - PERTINENT HX OF CURRENT PROBLEM, REHAB EVAL
Pt admitted with Covid 19 Hypoxia,  CT angio chest 11/10- bilat segmental pulmonary emboli in all lobes.

## 2021-11-19 LAB
ALBUMIN SERPL ELPH-MCNC: 2.4 G/DL — LOW (ref 3.3–5)
ALP SERPL-CCNC: 280 U/L — HIGH (ref 40–120)
ALT FLD-CCNC: 97 U/L — HIGH (ref 12–78)
AST SERPL-CCNC: 26 U/L — SIGNIFICANT CHANGE UP (ref 15–37)
BILIRUB DIRECT SERPL-MCNC: 0.21 MG/DL — HIGH (ref 0.05–0.2)
BILIRUB INDIRECT FLD-MCNC: 0.4 MG/DL — SIGNIFICANT CHANGE UP (ref 0.2–1)
BILIRUB SERPL-MCNC: 0.6 MG/DL — SIGNIFICANT CHANGE UP (ref 0.2–1.2)
CREAT SERPL-MCNC: 0.93 MG/DL — SIGNIFICANT CHANGE UP (ref 0.5–1.3)
GLUCOSE BLDC GLUCOMTR-MCNC: 153 MG/DL — HIGH (ref 70–99)
GLUCOSE BLDC GLUCOMTR-MCNC: 160 MG/DL — HIGH (ref 70–99)
GLUCOSE BLDC GLUCOMTR-MCNC: 178 MG/DL — HIGH (ref 70–99)
GLUCOSE BLDC GLUCOMTR-MCNC: 181 MG/DL — HIGH (ref 70–99)
HCT VFR BLD CALC: 40.7 % — SIGNIFICANT CHANGE UP (ref 39–50)
HGB BLD-MCNC: 13.5 G/DL — SIGNIFICANT CHANGE UP (ref 13–17)
INR BLD: 1.23 RATIO — HIGH (ref 0.88–1.16)
MCHC RBC-ENTMCNC: 29.4 PG — SIGNIFICANT CHANGE UP (ref 27–34)
MCHC RBC-ENTMCNC: 33.2 GM/DL — SIGNIFICANT CHANGE UP (ref 32–36)
MCV RBC AUTO: 88.7 FL — SIGNIFICANT CHANGE UP (ref 80–100)
NRBC # BLD: 0 /100 WBCS — SIGNIFICANT CHANGE UP (ref 0–0)
PLATELET # BLD AUTO: 302 K/UL — SIGNIFICANT CHANGE UP (ref 150–400)
PROT SERPL-MCNC: 6.9 GM/DL — SIGNIFICANT CHANGE UP (ref 6–8.3)
PROTHROM AB SERPL-ACNC: 14.1 SEC — HIGH (ref 10.6–13.6)
RBC # BLD: 4.59 M/UL — SIGNIFICANT CHANGE UP (ref 4.2–5.8)
RBC # FLD: 14.5 % — SIGNIFICANT CHANGE UP (ref 10.3–14.5)
WBC # BLD: 11.67 K/UL — HIGH (ref 3.8–10.5)
WBC # FLD AUTO: 11.67 K/UL — HIGH (ref 3.8–10.5)

## 2021-11-19 PROCEDURE — 99232 SBSQ HOSP IP/OBS MODERATE 35: CPT

## 2021-11-19 RX ADMIN — POLYETHYLENE GLYCOL 3350 17 GRAM(S): 17 POWDER, FOR SOLUTION ORAL at 12:40

## 2021-11-19 RX ADMIN — Medication 2: at 08:29

## 2021-11-19 RX ADMIN — ATORVASTATIN CALCIUM 20 MILLIGRAM(S): 80 TABLET, FILM COATED ORAL at 21:48

## 2021-11-19 RX ADMIN — Medication 2: at 12:40

## 2021-11-19 RX ADMIN — Medication 75 MICROGRAM(S): at 05:57

## 2021-11-19 RX ADMIN — PANTOPRAZOLE SODIUM 40 MILLIGRAM(S): 20 TABLET, DELAYED RELEASE ORAL at 05:57

## 2021-11-19 RX ADMIN — APIXABAN 5 MILLIGRAM(S): 2.5 TABLET, FILM COATED ORAL at 05:57

## 2021-11-19 RX ADMIN — Medication 2: at 17:18

## 2021-11-19 RX ADMIN — SENNA PLUS 2 TABLET(S): 8.6 TABLET ORAL at 21:48

## 2021-11-19 RX ADMIN — Medication 6 MILLIGRAM(S): at 05:57

## 2021-11-19 RX ADMIN — Medication 1 SPRAY(S): at 12:41

## 2021-11-19 RX ADMIN — APIXABAN 5 MILLIGRAM(S): 2.5 TABLET, FILM COATED ORAL at 17:18

## 2021-11-19 NOTE — PROGRESS NOTE ADULT - ASSESSMENT
70 yo male with history of Emphysema, HLD & hypothyroidism presented w/ acute hypoxic respiratory failure w/ severe sepsis DIRK due to COVID 19 PNA and bilateral subsegmental pulmonary embolism.      COVID19 PNA  - CT showed multifocal dense and ground-glass consolidations in a subpleural location and bibasilar predominance w/ adenopathy    - c/w Oxygen, patient is now off high flow NC an stable on 3-4L and continues to ween well. still hypoxic on ra. can dc home on 2-3 NC stable with ambulation   - c/w dexamethasone for a total of 10 days  - s/p Remdesivir course   - ID following and noted that the patient did not meet criteria for Toci    - pulm note read and appreciated   - incentive spirometer; instructed on use  - proning encouraged  - OOB to chair when awake  - albuterol PRN  - lactic acidosis resolved   - c/w albuterol      Fusiform aneurysm of the ascending aorta measuring up to 4.4 cm  - will need vascular eval once COVID PNA has resolved  - BP is well controlled off meds    Transaminitis  - likely due  COVID and sepsis. resolving   - neg hepatitis panel    PreDM   - Hgb A1C is 6  - will make patient aware & [place] on diet/ lifestyle modifications  - c/w ISS    Bilateral PE  - CTA showed segmental and subsegmental pulmonary emboli in all lobes  - changed heparin drip to Lovenox. amd now to Eliquis   - check Echo for rh strain     Gastritis  - c/w Protonix w/ PRN Maalox    Hypothyroidism   - c/w levothyroxine    Hyperlipidemia  - c/w Atorvastatin - will hold if LFTs do not improve    Constipation   - c/w Miralax and Senna    Prophylaxis:  DVT: Lovenox  GI: Protonix

## 2021-11-19 NOTE — PROGRESS NOTE ADULT - ATTENDING COMMENTS
Lab at bedside collecting blood at this time.
agree with above  being treated for covid and pulmonary embolism   changed to lovenox today   has a few more days of decadron   wean off HFNC in next couple of days   PT  chest PT  incentive spirometer     Vinay Vernon DO  Infectious Disease Attending  Pager [phone]  After 5pm/weekends please call [phone] for all inquiries and new consults
Language line jovita Jhonson [id number]    discussed in detail with patient the nature of his illness and why he is still in the hospital. He did not seem to understand that despite him feeling well and having no symptoms that it wouldn't be safe to discharge him as he is requiring 6L of humidified oxygen with difficulty in titrating down. After our discussion he understood the need to stay while the entire team works on titrating down his oxygen: namely by providing chest PT, encouraging incentive spirometry, proning, oxygen delivery via nasal cannula, PT, anticoagulation for his pulmonary embolism and dexamethasone.    Discussed with Dr. Sammy Vernon, DO  Infectious Disease Attending  Pager [phone]  After 5pm/weekends please call [phone] for all inquiries and new consults
patient seen and examined  doing well but still slow titration down on oxygen  completed RDV   ongoing steroids and anticoagulation    Vinay Vernon DO  Infectious Disease Attending  Pager [phone]  After 5pm/weekends please call [phone] for all inquiries and new consults
oxygenation continues to improve with requiring less supp oxygen   may or may not need oxygen at home   continue weaning and treated his pulmonary embolism   no objection to discharge home (even if requiring oxygen assuming he needs it and its covered by insurance)     will sign off. Please call with questions    D/W Dr. Sammy Vernon, DO  Infectious Disease Attending  Pager [phone]  After 5pm/weekends please call [phone] for all inquiries and new consults
agree with above  being treated for covid and pulmonary embolism   changed to lovenox today   has a few more days of decadron   wean off HFNC in next couple of days   PT  chest PT  incentive spirometer     Vinay Vernon DO  Infectious Disease Attending  Pager [phone]  After 5pm/weekends please call [phone] for all inquiries and new consults

## 2021-11-19 NOTE — PROGRESS NOTE ADULT - ASSESSMENT
72 yo male with history of HLD, hypothyroidism who presents from home after testing positive for COVID on 11/4.   His symptoms started a week before Halloween   Presented hypoxic requiring 4L nasal cannula and was quicklly moved to HFNC where he feels more comfortable and saturation is better.   CTA (I personally reviewed) on admission showed bilateral subsegmental PE as well as GGO bilaterally and patient started on heparin drip.     11/12: still on HFNC, doing better, remains on heparin drip mil d transaminitis    11/15: no fevers, leukocytosis normalized, remains on HFNC, Cr ok, transaminitis is better, the pt completed 5 days course of remdesivir, need 4 more days of decadron - order re-instated.   11/16: titrating oxygen, feels well, vitals stable other then hypoxia,   11/17: no fevers, WBC 11.84, Cr ok, LFTs better, O2 demand decreased, now on 6L NC with O2 SAT 93% during my exam, comfortable, s/p course of remdesivir, on decadron. All pt's questions answered to the best of my ability.   Attending addendum:  Language line solutions Elizabeth [id number]    discussed in detail with patient the nature of his illness and why he is still in the hospital. He did not seem to understand that despite him feeling well and having no symptoms that it wouldn't be safe to discharge him as he is requiring 6L of humidified oxygen with difficulty in titrating down. After our discussion he understood the need to stay while the entire team works on titrating down his oxygen: namely by providing chest PT, encouraging incentive spirometry, proning, oxygen delivery via nasal cannula, PT, anticoagulation for his pulmonary embolism and dexamethasone.    11/18: remains on NC, 5L during my exam with O2 SAT 92%, comfortable, no fevers, WBC with no change, s/p course of remdesivir, on Decadron, on AC for pulmonary embolism.   Attending addendum:  patient seen and examined  doing well but still slow titration down on oxygen  completed RDV   ongoing steroids and anticoagulation    11/19: improving, on 3L NC now and with O2 SAT 97-98%, no fevers, mild leukocytosis, Cr ok, completed course of remdesivir and decadron.       #acute hypoxemic resp failure 2/2 COVID 19 pneumonia  #pulmonary embolism    #high serum lactate  #Gerd    #COVID  Monitor clinically.  Monitor Oxygenation  O2 supplementation.  Remdesivir 5 days course complete   Monitor CBC, CMP daily  Ferritin, CRP, and D dimer q 48 hrs.  IV Dexamethasone 6mg q24hrs x10 days - course complete   full dose Anticoagulation for pulmonary embolism   Treatment options are limited-this as well as limitations of data discussed with pt.  Monitor for any bacterial superinfection/complications.  discussed tocilizumab with patient and daughter: since he has been sick for more then 2 weeks and the benefit is small with a infectious risks we came to the conclusion of not giving tocilizumab   This tx plan was formulated utilizing my clinical judgement, currently available local/regional anecdotal information, organizational treatment recommendations with COVID-19 specific considerations given rapidly changing information available.    chest PT, proning as tolerated   please continue to titrate down the oxygen       #PE  AC per medicine  continue full dose AC with lovenox   wean oxygen   consider Transthoracic Echocardiogram - pending     #lactate   was elevated on admission likely related to hypoxia  improved with oxygen and fluids    #GERD  patient takes omeprazole at home  he is on high dose steroids which he feels is worsening his gastritis  PPI-Protonix has been started        Will sign off, re-consult as needed     Msg sent to Dr. Christianson

## 2021-11-19 NOTE — CHART NOTE - NSCHARTNOTEFT_GEN_A_CORE
The Patient was admitted to the hospital on 11/10/21 and remains hospitalised on 11/19/21.     Mehdi Christianson MD

## 2021-11-19 NOTE — PROGRESS NOTE ADULT - SUBJECTIVE AND OBJECTIVE BOX
ARTHUR ZAZUETA  MRN-[id number]    Follow Up:  COVID 19    Interval History: Bulgarian speaking male, [employer] telephone  used # [id number]. Pt states that he is doing much better, out of bed to chair, on 3L NC with O2 SAT 97% during my exam, no fevers, WBC is about the same.     PAST MEDICAL & SURGICAL HISTORY:  Hypothyroidism    Hyperlipidemia    Emphysema lung    No significant past surgical history        ROS:    [ ] Unobtainable because:  [x ] All other systems negative    Constitutional: no fever, no chills  Head: no trauma  Eyes: no vision changes, no eye pain  ENT:  no sore throat, no rhinorrhea  Cardiovascular:  no chest pain, no palpitation  Respiratory:  no SOB with supplemental O2, no cough  GI:  no abd pain, no vomiting, no diarrhea  urinary: no dysuria, no hematuria, no flank pain  musculoskeletal:  no joint pain, no joint swelling  skin:  no rash  neurology:  no headache, no seizure, no change in mental status  psych: no anxiety, no depression         Allergies  No Known Allergies        ANTIMICROBIALS:      OTHER MEDS:  acetaminophen     Tablet .. 650 milliGRAM(s) Oral every 4 hours PRN  ALBUTerol    90 MICROgram(s) HFA Inhaler 2 Puff(s) Inhalation every 6 hours PRN  aluminum hydroxide/magnesium hydroxide/simethicone Suspension 30 milliLiter(s) Oral every 8 hours PRN  apixaban 5 milliGRAM(s) Oral every 12 hours  atorvastatin 20 milliGRAM(s) Oral at bedtime  dextrose 40% Gel 15 Gram(s) Oral once  dextrose 5%. 1000 milliLiter(s) IV Continuous <Continuous>  dextrose 5%. 1000 milliLiter(s) IV Continuous <Continuous>  dextrose 50% Injectable 25 Gram(s) IV Push once  dextrose 50% Injectable 12.5 Gram(s) IV Push once  dextrose 50% Injectable 25 Gram(s) IV Push once  glucagon  Injectable 1 milliGRAM(s) IntraMuscular once  guaiFENesin Oral Liquid (Sugar-Free) 200 milliGRAM(s) Oral every 6 hours PRN  insulin lispro (ADMELOG) corrective regimen sliding scale   SubCutaneous three times a day before meals  insulin lispro (ADMELOG) corrective regimen sliding scale   SubCutaneous at bedtime  levothyroxine 75 MICROGram(s) Oral daily  pantoprazole    Tablet 40 milliGRAM(s) Oral before breakfast  polyethylene glycol 3350 17 Gram(s) Oral daily  senna 2 Tablet(s) Oral at bedtime  sodium chloride 0.65% Nasal 1 Spray(s) Both Nostrils daily      Vital Signs Last 24 Hrs  T(C): 36.4 (19 Nov 2021 10:15), Max: 36.5 (18 Nov 2021 15:58)  T(F): 97.6 (19 Nov 2021 10:15), Max: 97.7 (18 Nov 2021 15:58)  HR: 69 (19 Nov 2021 10:15) (61 - 76)  BP: 92/57 (19 Nov 2021 10:15) (92/57 - 108/69)  BP(mean): --  RR: 18 (19 Nov 2021 10:15) (17 - 18)  SpO2: 97% (19 Nov 2021 10:15) (94% - 97%)    Physical Exam:  General:    NAD,  non toxic, on 3 L NC  Head: atraumatic, normocephalic  Eye: normal sclera and conjunctiva  ENT:    no oral lesions, neck supple  Cardio:     regular S1, S2,  no murmur  Respiratory:  diminished at bases bilaterally, lungs sounds with improvement today,    no wheezing  abd:     soft,   BS +,   no tenderness  :   no CVAT,  no suprapubic tenderness,   no  monzon  Musculoskeletal:   no joint swelling,   no edema  vascular: no central lines, +PIV   Skin:    no rash  Neurologic:     no focal deficit  psych: normal affect    WBC Count: 11.67 K/uL (11-19 @ 07:02)  WBC Count: 11.59 K/uL (11-18 @ 07:34)  WBC Count: 11.84 K/uL (11-17 @ 07:50)  WBC Count: 11.35 K/uL (11-16 @ 07:27)  WBC Count: 9.72 K/uL (11-15 @ 08:03)  WBC Count: 11.93 K/uL (11-14 @ 06:36)  WBC Count: 9.93 K/uL (11-13 @ 07:20)                            13.5   11.67 )-----------( 302      ( 19 Nov 2021 07:02 )             40.7       11-19    x   |  x   |  x   ----------------------------<  x   x    |  x   |  0.93    Ca    8.5      18 Nov 2021 07:34    TPro  6.9  /  Alb  2.4<L>  /  TBili  0.6  /  DBili  .21<H>  /  AST  26  /  ALT  97<H>  /  AlkPhos  280<H>  11-19          Creatinine Trend: 0.93<--, 1.14<--, 1.09<--, 0.91<--, 0.88<--, 0.81<--      MICROBIOLOGY:  v  .Blood  11-11-21   No Growth Final  --  --      C-Reactive Protein, Serum: 10 (11-17)  C-Reactive Protein, Serum: 14 (11-16)    Ferritin, Serum: 1075 (11-16)      D-Dimer Assay, Quantitative: 2223 (11-16)  D-Dimer Assay, Quantitative: 3224 (11-13)  D-Dimer Assay, Quantitative: 19500 (11-10)        RADIOLOGY:     ARTHUR ZAZUETA  MRN-[id number]    Follow Up:  COVID 19    Interval History: [place] speaking male, [employer] telephone  used # [id number]. Pt states that he is doing much better, out of bed to chair, on 3L NC with O2 SAT 97% during my exam, no fevers, WBC is about the same.     PAST MEDICAL & SURGICAL HISTORY:  Hypothyroidism    Hyperlipidemia    Emphysema lung    No significant past surgical history        ROS:    [ ] Unobtainable because:  [x ] All other systems negative    Constitutional: no fever, no chills  Head: no trauma  Eyes: no vision changes, no eye pain  ENT:  no sore throat, no rhinorrhea  Cardiovascular:  no chest pain, no palpitation  Respiratory:  no SOB with supplemental O2, no cough  GI:  no abd pain, no vomiting, no diarrhea  urinary: no dysuria, no hematuria, no flank pain  musculoskeletal:  no joint pain, no joint swelling  skin:  no rash  neurology:  no headache, no seizure, no change in mental status  psych: no anxiety, no depression         Allergies  No Known Allergies        ANTIMICROBIALS:      OTHER MEDS:  acetaminophen     Tablet .. 650 milliGRAM(s) Oral every 4 hours PRN  ALBUTerol    90 MICROgram(s) HFA Inhaler 2 Puff(s) Inhalation every 6 hours PRN  aluminum hydroxide/magnesium hydroxide/simethicone Suspension 30 milliLiter(s) Oral every 8 hours PRN  apixaban 5 milliGRAM(s) Oral every 12 hours  atorvastatin 20 milliGRAM(s) Oral at bedtime  dextrose 40% Gel 15 Gram(s) Oral once  dextrose 5%. 1000 milliLiter(s) IV Continuous <Continuous>  dextrose 5%. 1000 milliLiter(s) IV Continuous <Continuous>  dextrose 50% Injectable 25 Gram(s) IV Push once  dextrose 50% Injectable 12.5 Gram(s) IV Push once  dextrose 50% Injectable 25 Gram(s) IV Push once  glucagon  Injectable 1 milliGRAM(s) IntraMuscular once  guaiFENesin Oral Liquid (Sugar-Free) 200 milliGRAM(s) Oral every 6 hours PRN  insulin lispro (ADMELOG) corrective regimen sliding scale   SubCutaneous three times a day before meals  insulin lispro (ADMELOG) corrective regimen sliding scale   SubCutaneous at bedtime  levothyroxine 75 MICROGram(s) Oral daily  pantoprazole    Tablet 40 milliGRAM(s) Oral before breakfast  polyethylene glycol 3350 17 Gram(s) Oral daily  senna 2 Tablet(s) Oral at bedtime  sodium chloride 0.65% Nasal 1 Spray(s) Both Nostrils daily      Vital Signs Last 24 Hrs  T(C): 36.4 (19 Nov 2021 10:15), Max: 36.5 (18 Nov 2021 15:58)  T(F): 97.6 (19 Nov 2021 10:15), Max: 97.7 (18 Nov 2021 15:58)  HR: 69 (19 Nov 2021 10:15) (61 - 76)  BP: 92/57 (19 Nov 2021 10:15) (92/57 - 108/69)  BP(mean): --  RR: 18 (19 Nov 2021 10:15) (17 - 18)  SpO2: 97% (19 Nov 2021 10:15) (94% - 97%)    Physical Exam:  General:    NAD,  non toxic, on 3 L NC  Head: atraumatic, normocephalic  Eye: normal sclera and conjunctiva  ENT:    no oral lesions, neck supple  Cardio:     regular S1, S2,  no murmur  Respiratory:  diminished at bases bilaterally, lungs sounds with improvement today,    no wheezing  abd:     soft,   BS +,   no tenderness  :   no CVAT,  no suprapubic tenderness,   no  monzon  Musculoskeletal:   no joint swelling,   no edema  vascular: no central lines, +PIV   Skin:    no rash  Neurologic:     no focal deficit  psych: normal affect    WBC Count: 11.67 K/uL (11-19 @ 07:02)  WBC Count: 11.59 K/uL (11-18 @ 07:34)  WBC Count: 11.84 K/uL (11-17 @ 07:50)  WBC Count: 11.35 K/uL (11-16 @ 07:27)  WBC Count: 9.72 K/uL (11-15 @ 08:03)  WBC Count: 11.93 K/uL (11-14 @ 06:36)  WBC Count: 9.93 K/uL (11-13 @ 07:20)                            13.5   11.67 )-----------( 302      ( 19 Nov 2021 07:02 )             40.7       11-19    x   |  x   |  x   ----------------------------<  x   x    |  x   |  0.93    Ca    8.5      18 Nov 2021 07:34    TPro  6.9  /  Alb  2.4<L>  /  TBili  0.6  /  DBili  .21<H>  /  AST  26  /  ALT  97<H>  /  AlkPhos  280<H>  11-19          Creatinine Trend: 0.93<--, 1.14<--, 1.09<--, 0.91<--, 0.88<--, 0.81<--      MICROBIOLOGY:  v  .Blood  11-11-21   No Growth Final  --  --      C-Reactive Protein, Serum: 10 (11-17)  C-Reactive Protein, Serum: 14 (11-16)    Ferritin, Serum: 1075 (11-16)      D-Dimer Assay, Quantitative: 2223 (11-16)  D-Dimer Assay, Quantitative: 3224 (11-13)  D-Dimer Assay, Quantitative: 27687 (11-10)        RADIOLOGY:  no new imaging

## 2021-11-19 NOTE — PROGRESS NOTE ADULT - SUBJECTIVE AND OBJECTIVE BOX
INTERVAL HPI:  70 yo male with HLD, Hypothyroidism  and Emphysema per history. Got 1st dose of Moderna Vaccine on 10/04/21.  Tested positive for COVID on 11/04/21.  Came with SOB, Cough and pleuritic chest pain, found to be hypoxic. Testing showed bilateral PE, lung infiltrates.  Admitted with hypoxic Respiratory failure.    OVERNIGHT EVENTS:  Out of bed to chair.    Vital Signs Last 24 Hrs  T(C): 36.4 (19 Nov 2021 10:15), Max: 36.5 (18 Nov 2021 15:58)  T(F): 97.6 (19 Nov 2021 10:15), Max: 97.7 (18 Nov 2021 15:58)  HR: 69 (19 Nov 2021 10:15) (61 - 76)  BP: 92/57 (19 Nov 2021 10:15) (92/57 - 108/69)  BP(mean): --  RR: 18 (19 Nov 2021 10:15) (17 - 18)  SpO2: 97% (19 Nov 2021 10:15) (94% - 97%)    PHYSICAL EXAM:  GEN:         Awake, responsive and comfortable.  HEENT:    Normal.    RESP:       no distress  CVS:          Regular rate and rhythm.      MEDICATIONS  (STANDING):  apixaban 5 milliGRAM(s) Oral every 12 hours  atorvastatin 20 milliGRAM(s) Oral at bedtime  dextrose 40% Gel 15 Gram(s) Oral once  dextrose 5%. 1000 milliLiter(s) (50 mL/Hr) IV Continuous <Continuous>  dextrose 5%. 1000 milliLiter(s) (100 mL/Hr) IV Continuous <Continuous>  dextrose 50% Injectable 25 Gram(s) IV Push once  dextrose 50% Injectable 12.5 Gram(s) IV Push once  dextrose 50% Injectable 25 Gram(s) IV Push once  glucagon  Injectable 1 milliGRAM(s) IntraMuscular once  insulin lispro (ADMELOG) corrective regimen sliding scale   SubCutaneous three times a day before meals  insulin lispro (ADMELOG) corrective regimen sliding scale   SubCutaneous at bedtime  levothyroxine 75 MICROGram(s) Oral daily  pantoprazole    Tablet 40 milliGRAM(s) Oral before breakfast  polyethylene glycol 3350 17 Gram(s) Oral daily  senna 2 Tablet(s) Oral at bedtime  sodium chloride 0.65% Nasal 1 Spray(s) Both Nostrils daily    MEDICATIONS  (PRN):  acetaminophen     Tablet .. 650 milliGRAM(s) Oral every 4 hours PRN Temp greater or equal to 38.5C (101.3F)  ALBUTerol    90 MICROgram(s) HFA Inhaler 2 Puff(s) Inhalation every 6 hours PRN Shortness of Breath  aluminum hydroxide/magnesium hydroxide/simethicone Suspension 30 milliLiter(s) Oral every 8 hours PRN Dyspepsia  guaiFENesin Oral Liquid (Sugar-Free) 200 milliGRAM(s) Oral every 6 hours PRN Cough    LABS:                        13.5   11.67 )-----------( 302      ( 19 Nov 2021 07:02 )             40.7     11-19    x   |  x   |  x   ----------------------------<  x   x    |  x   |  0.93    Ca    8.5      18 Nov 2021 07:34    TPro  6.9  /  Alb  2.4<L>  /  TBili  0.6  /  DBili  .21<H>  /  AST  26  /  ALT  97<H>  /  AlkPhos  280<H>  11-19  PT/INR - ( 19 Nov 2021 07:02 )   PT: 14.1 sec;   INR: 1.23 ratio        ASSESSMENT AND PLAN:  ·	Acute hypoxic Respiratory failure.  ·	COVID Pneumonia.  ·	Bilateral PE.  ·	Hypothyroidism.  ·	HLD.  ·	Anemia.    Comfortable on nasal O2.  Continue Dexamethasone and apixaban.  Completed Remdesivir.

## 2021-11-19 NOTE — PROGRESS NOTE ADULT - SUBJECTIVE AND OBJECTIVE BOX
70 yo male with history of Emphysema, HLD & hypothyroidism presented w/ acute hypoxic respiratory failure w/ severe sepsis DIRK due to COVID 19 PNA and bilateral subsegmental pulmonary embolism.     breathing improving. eating well      MEDICATIONS  (STANDING):  atorvastatin 20 milliGRAM(s) Oral at bedtime  dexAMETHasone  Injectable 6 milliGRAM(s) IV Push daily  dextrose 40% Gel 15 Gram(s) Oral once  dextrose 5%. 1000 milliLiter(s) (50 mL/Hr) IV Continuous <Continuous>  dextrose 5%. 1000 milliLiter(s) (100 mL/Hr) IV Continuous <Continuous>  dextrose 50% Injectable 25 Gram(s) IV Push once  dextrose 50% Injectable 12.5 Gram(s) IV Push once  dextrose 50% Injectable 25 Gram(s) IV Push once  enoxaparin Injectable 70 milliGRAM(s) SubCutaneous <User Schedule>  glucagon  Injectable 1 milliGRAM(s) IntraMuscular once  insulin lispro (ADMELOG) corrective regimen sliding scale   SubCutaneous three times a day before meals  insulin lispro (ADMELOG) corrective regimen sliding scale   SubCutaneous at bedtime  levothyroxine 75 MICROGram(s) Oral daily  pantoprazole    Tablet 40 milliGRAM(s) Oral before breakfast  polyethylene glycol 3350 17 Gram(s) Oral daily  senna 2 Tablet(s) Oral at bedtime  sodium chloride 0.65% Nasal 1 Spray(s) Both Nostrils daily    MEDICATIONS  (PRN):  acetaminophen     Tablet .. 650 milliGRAM(s) Oral every 4 hours PRN Temp greater or equal to 38.5C (101.3F)  ALBUTerol    90 MICROgram(s) HFA Inhaler 2 Puff(s) Inhalation every 6 hours PRN Shortness of Breath  aluminum hydroxide/magnesium hydroxide/simethicone Suspension 30 milliLiter(s) Oral every 8 hours PRN Dyspepsia  guaiFENesin Oral Liquid (Sugar-Free) 200 milliGRAM(s) Oral every 6 hours PRN Cough      Allergies    No Known Allergies    Intolerances        Vital Signs Last 24 Hrs  T(C): 36.4 (19 Nov 2021 16:19), Max: 36.4 (19 Nov 2021 10:15)  T(F): 97.5 (19 Nov 2021 16:19), Max: 97.6 (19 Nov 2021 10:15)  HR: 69 (19 Nov 2021 16:19) (61 - 70)  BP: 99/58 (19 Nov 2021 16:19) (92/57 - 108/69)  BP(mean): --  RR: 18 (19 Nov 2021 10:15) (17 - 18)  SpO2: 94% (19 Nov 2021 16:19) (88% - 97%)      PHYSICAL EXAM:  GENERAL: NAD, well-groomed, well-developed  HEAD:  Atraumatic, Normocephalic  EYES: EOMI, PERRLA   NECK: Supple   NERVOUS SYSTEM:  Alert    CHEST/LUNG: Dec BS bilaterally   HEART: Regular rate and rhythm; No murmurs, rubs, or gallops  ABDOMEN: Soft, Nontender, Nondistended; Bowel sounds present  EXTREMITIES:  No clubbing, cyanosis, or edema    LABS:                                                      13.5   11.67 )-----------( 302      ( 19 Nov 2021 07:02 )             40.7     11-19    x   |  x   |  x   ----------------------------<  x   x    |  x   |  0.93    Ca    8.5      18 Nov 2021 07:34    TPro  6.9  /  Alb  2.4<L>  /  TBili  0.6  /  DBili  .21<H>  /  AST  26  /  ALT  97<H>  /  AlkPhos  280<H>  11-19    PT/INR - ( 19 Nov 2021 07:02 )   PT: 14.1 sec;   INR: 1.23 ratio                   RADIOLOGY & ADDITIONAL TESTS:    11-15-21 @ 07:01  -  11-16-21 @ 07:00  --------------------------------------------------------  IN:  Total IN: 0 mL    OUT:    Voided (mL): 500 mL  Total OUT: 500 mL    Total NET: -500 mL

## 2021-11-20 ENCOUNTER — TRANSCRIPTION ENCOUNTER (OUTPATIENT)
Age: 71
End: 2021-11-20

## 2021-11-20 VITALS
TEMPERATURE: 97 F | SYSTOLIC BLOOD PRESSURE: 93 MMHG | DIASTOLIC BLOOD PRESSURE: 59 MMHG | RESPIRATION RATE: 18 BRPM | HEART RATE: 71 BPM | OXYGEN SATURATION: 96 %

## 2021-11-20 LAB
GLUCOSE BLDC GLUCOMTR-MCNC: 95 MG/DL — SIGNIFICANT CHANGE UP (ref 70–99)
GLUCOSE BLDC GLUCOMTR-MCNC: 97 MG/DL — SIGNIFICANT CHANGE UP (ref 70–99)
HCT VFR BLD CALC: 40.9 % — SIGNIFICANT CHANGE UP (ref 39–50)
HGB BLD-MCNC: 13.6 G/DL — SIGNIFICANT CHANGE UP (ref 13–17)
MCHC RBC-ENTMCNC: 29.5 PG — SIGNIFICANT CHANGE UP (ref 27–34)
MCHC RBC-ENTMCNC: 33.3 GM/DL — SIGNIFICANT CHANGE UP (ref 32–36)
MCV RBC AUTO: 88.7 FL — SIGNIFICANT CHANGE UP (ref 80–100)
NRBC # BLD: 0 /100 WBCS — SIGNIFICANT CHANGE UP (ref 0–0)
PLATELET # BLD AUTO: 267 K/UL — SIGNIFICANT CHANGE UP (ref 150–400)
RBC # BLD: 4.61 M/UL — SIGNIFICANT CHANGE UP (ref 4.2–5.8)
RBC # FLD: 14.5 % — SIGNIFICANT CHANGE UP (ref 10.3–14.5)
WBC # BLD: 12.5 K/UL — HIGH (ref 3.8–10.5)
WBC # FLD AUTO: 12.5 K/UL — HIGH (ref 3.8–10.5)

## 2021-11-20 PROCEDURE — 99239 HOSP IP/OBS DSCHRG MGMT >30: CPT

## 2021-11-20 RX ORDER — ALBUTEROL 90 UG/1
2 AEROSOL, METERED ORAL
Qty: 0 | Refills: 0 | DISCHARGE
Start: 2021-11-20

## 2021-11-20 RX ORDER — APIXABAN 2.5 MG/1
1 TABLET, FILM COATED ORAL
Qty: 60 | Refills: 1
Start: 2021-11-20 | End: 2022-01-18

## 2021-11-20 RX ORDER — INFLUENZA VIRUS VACCINE 15; 15; 15; 15 UG/.5ML; UG/.5ML; UG/.5ML; UG/.5ML
0.7 SUSPENSION INTRAMUSCULAR ONCE
Refills: 0 | Status: COMPLETED | OUTPATIENT
Start: 2021-11-20 | End: 2021-11-20

## 2021-11-20 RX ADMIN — Medication 75 MICROGRAM(S): at 05:40

## 2021-11-20 RX ADMIN — PANTOPRAZOLE SODIUM 40 MILLIGRAM(S): 20 TABLET, DELAYED RELEASE ORAL at 05:40

## 2021-11-20 RX ADMIN — Medication 1 SPRAY(S): at 12:59

## 2021-11-20 RX ADMIN — APIXABAN 5 MILLIGRAM(S): 2.5 TABLET, FILM COATED ORAL at 05:40

## 2021-11-20 RX ADMIN — APIXABAN 5 MILLIGRAM(S): 2.5 TABLET, FILM COATED ORAL at 17:20

## 2021-11-20 NOTE — DISCHARGE NOTE PROVIDER - NSDCCPCAREPLAN_GEN_ALL_CORE_FT
PRINCIPAL DISCHARGE DIAGNOSIS  Diagnosis: 2019 novel coronavirus disease (COVID-19)  Assessment and Plan of Treatment: 70 yo male with history of Emphysema, HLD & hypothyroidism presented w/ acute hypoxic respiratory failure w/ severe sepsis DIRK due to COVID 19 PNA and bilateral subsegmental pulmonary embolism.    COVID19 PNA  - breathing mch improved and doing well currently off o2 at rest. will be dc with prn o2   - s/p Remdesivir course and steroids   - ID following and noted that the patient did not meet criteria for Toci    - follow up with Dr. Moses on discharge   Fusiform aneurysm of the ascending aorta measuring up to 4.4 cm  - will need vascular eval once COVID PNA has resolved  - BP is well controlled off meds  Transaminitis  - likely due  COVID and sepsis. resolving   - neg hepatitis panel  PreDM   - Hgb A1C is 6  - will make patient aware & [place] on diet/ lifestyle modifications  - c/w ISS  Bilateral PE  - CTA showed segmental and subsegmental pulmonary emboli in all lobes  - changed heparin drip to Lovenox. and now to Eliquis   . will need yo be on anticoagulation for 3 months   Gastritis  - c/w Protonix w/ PRN Maalox  Hypothyroidism   - c/w levothyroxine  Hyperlipidemia  - c/w Atorvastatin - will hold if LFTs do not improve        SECONDARY DISCHARGE DIAGNOSES  Diagnosis: Hypoxia  Assessment and Plan of Treatment:

## 2021-11-20 NOTE — DISCHARGE NOTE PROVIDER - CARE PROVIDER_API CALL
Dakota Moses)  Medicine  [address] [facility], [address]  [place] [address]  Phone: [phone]  Fax: [phone]  Follow Up Time:     Teto Gay)  Vascular Surgery  [address] [facility], [address] [place] [address]  Phone: [phone]  Fax: [phone]  Follow Up Time:

## 2021-11-20 NOTE — DISCHARGE NOTE PROVIDER - HOSPITAL COURSE
70 yo male with history of Emphysema, HLD & hypothyroidism presented w/ acute hypoxic respiratory failure w/ severe sepsis DIRK due to COVID 19 PNA and bilateral subsegmental pulmonary embolism.      COVID19 PNA  - breathing mch improved and doing well currently off o2 at rest. will be dc with prn o2   - s/p Remdesivir course and steroids   - ID following and noted that the patient did not meet criteria for Toci    - follow up with Dr. Moses on discharge         Fusiform aneurysm of the ascending aorta measuring up to 4.4 cm  - will need vascular eval once COVID PNA has resolved  - BP is well controlled off meds    Transaminitis  - likely due  COVID and sepsis. resolving   - neg hepatitis panel    PreDM   - Hgb A1C is 6  - will make patient aware & [place] on diet/ lifestyle modifications  - c/w ISS    Bilateral PE  - CTA showed segmental and subsegmental pulmonary emboli in all lobes  - changed heparin drip to Lovenox. and now to Eliquis   . will need yo be on anticoagulation for 3 months       Gastritis  - c/w Protonix w/ PRN Maalox    Hypothyroidism   - c/w levothyroxine    Hyperlipidemia  - c/w Atorvastatin - will hold if LFTs do not improve

## 2021-11-20 NOTE — DISCHARGE NOTE PROVIDER - NSDCMRMEDTOKEN_GEN_ALL_CORE_FT
albuterol 90 mcg/inh inhalation aerosol: 2 puff(s) inhaled every 6 hours, As needed, Shortness of Breath  apixaban 5 mg oral tablet: 1 tab(s) orally every 12 hours  atorvastatin 20 mg oral tablet: 1 tab(s) orally once a day  Synthroid 75 mcg (0.075 mg) oral tablet: 1 tab(s) orally once a day

## 2021-11-20 NOTE — DISCHARGE NOTE NURSING/CASE MANAGEMENT/SOCIAL WORK - PATIENT PORTAL LINK FT
You can access the FollowMyHealth Patient Portal offered by [facility] by registering at the following website: [email or web address]. By joining [employer]’s FollowMyHealth portal, you will also be able to view your health information using other applications (apps) compatible with our system.

## 2021-11-20 NOTE — PROGRESS NOTE ADULT - SUBJECTIVE AND OBJECTIVE BOX
INTERVAL HPI:  70 yo male with HLD, Hypothyroidism  and Emphysema per history. Got 1st dose of Moderna Vaccine on 10/04/21.  Tested positive for COVID on 11/04/21.  Came with SOB, Cough and pleuritic chest pain, found to be hypoxic. Testing showed bilateral PE, lung infiltrates.  Admitted with hypoxic Respiratory failure.    OVERNIGHT EVENTS:  Comfortable in bed.    Vital Signs Last 24 Hrs  T(C): 36.4 (20 Nov 2021 10:40), Max: 36.5 (19 Nov 2021 23:36)  T(F): 97.5 (20 Nov 2021 10:40), Max: 97.7 (19 Nov 2021 23:36)  HR: 68 (20 Nov 2021 10:40) (59 - 69)  BP: 98/56 (20 Nov 2021 10:40) (98/56 - 104/58)  BP(mean): --  RR: 18 (20 Nov 2021 13:13) (16 - 18)  SpO2: 92% (20 Nov 2021 13:13) (92% - 96%)    PHYSICAL EXAM:  GEN:         Awake, responsive and comfortable.  HEENT:    Normal.    RESP:       no distress  CVS:          Regular rate and rhythm.   ABD:         Soft, non-tender, non-distended;     MEDICATIONS  (STANDING):  apixaban 5 milliGRAM(s) Oral every 12 hours  atorvastatin 20 milliGRAM(s) Oral at bedtime  dextrose 40% Gel 15 Gram(s) Oral once  dextrose 5%. 1000 milliLiter(s) (50 mL/Hr) IV Continuous <Continuous>  dextrose 5%. 1000 milliLiter(s) (100 mL/Hr) IV Continuous <Continuous>  dextrose 50% Injectable 25 Gram(s) IV Push once  dextrose 50% Injectable 12.5 Gram(s) IV Push once  dextrose 50% Injectable 25 Gram(s) IV Push once  glucagon  Injectable 1 milliGRAM(s) IntraMuscular once  insulin lispro (ADMELOG) corrective regimen sliding scale   SubCutaneous three times a day before meals  insulin lispro (ADMELOG) corrective regimen sliding scale   SubCutaneous at bedtime  levothyroxine 75 MICROGram(s) Oral daily  pantoprazole    Tablet 40 milliGRAM(s) Oral before breakfast  polyethylene glycol 3350 17 Gram(s) Oral daily  senna 2 Tablet(s) Oral at bedtime  sodium chloride 0.65% Nasal 1 Spray(s) Both Nostrils daily    MEDICATIONS  (PRN):  acetaminophen     Tablet .. 650 milliGRAM(s) Oral every 4 hours PRN Temp greater or equal to 38.5C (101.3F)  ALBUTerol    90 MICROgram(s) HFA Inhaler 2 Puff(s) Inhalation every 6 hours PRN Shortness of Breath  aluminum hydroxide/magnesium hydroxide/simethicone Suspension 30 milliLiter(s) Oral every 8 hours PRN Dyspepsia  guaiFENesin Oral Liquid (Sugar-Free) 200 milliGRAM(s) Oral every 6 hours PRN Cough    LABS:                        13.6   12.50 )-----------( 267      ( 20 Nov 2021 07:58 )             40.9     11-19    x   |  x   |  x   ----------------------------<  x   x    |  x   |  0.93    TPro  6.9  /  Alb  2.4<L>  /  TBili  0.6  /  DBili  .21<H>  /  AST  26  /  ALT  97<H>  /  AlkPhos  280<H>  11-19    PT/INR - ( 19 Nov 2021 07:02 )   PT: 14.1 sec;   INR: 1.23 ratio         ASSESSMENT AND PLAN:  ·	Acute hypoxic Respiratory failure.  ·	COVID Pneumonia.  ·	Bilateral PE.  ·	Hypothyroidism.  ·	HLD.  ·	Anemia.    SPO2 93% on room air at rest.  Completed Remdesivir and dexamethasone.  On apixaban for PE.

## 2021-11-20 NOTE — PROGRESS NOTE ADULT - PROVIDER SPECIALTY LIST ADULT
Hospitalist
Infectious Disease
Pulmonology
Hospitalist
Infectious Disease
Infectious Disease
Pulmonology
Pulmonology
Hospitalist
Infectious Disease

## 2021-11-26 DIAGNOSIS — J96.01 ACUTE RESPIRATORY FAILURE WITH HYPOXIA: ICD-10-CM

## 2021-11-26 DIAGNOSIS — K29.70 GASTRITIS, UNSPECIFIED, WITHOUT BLEEDING: ICD-10-CM

## 2021-11-26 DIAGNOSIS — I26.94 MULTIPLE SUBSEGMENTAL PULMONARY EMBOLI WITHOUT ACUTE COR PULMONALE: ICD-10-CM

## 2021-11-26 DIAGNOSIS — K59.00 CONSTIPATION, UNSPECIFIED: ICD-10-CM

## 2021-11-26 DIAGNOSIS — R73.03 PREDIABETES: ICD-10-CM

## 2021-11-26 DIAGNOSIS — E03.9 HYPOTHYROIDISM, UNSPECIFIED: ICD-10-CM

## 2021-11-26 DIAGNOSIS — Z87.891 PERSONAL HISTORY OF NICOTINE DEPENDENCE: ICD-10-CM

## 2021-11-26 DIAGNOSIS — R65.20 SEVERE SEPSIS WITHOUT SEPTIC SHOCK: ICD-10-CM

## 2021-11-26 DIAGNOSIS — E78.5 HYPERLIPIDEMIA, UNSPECIFIED: ICD-10-CM

## 2021-11-26 DIAGNOSIS — R74.01 ELEVATION OF LEVELS OF LIVER TRANSAMINASE LEVELS: ICD-10-CM

## 2021-11-26 DIAGNOSIS — U07.1 COVID-19: ICD-10-CM

## 2021-11-26 DIAGNOSIS — J43.9 EMPHYSEMA, UNSPECIFIED: ICD-10-CM

## 2021-11-26 DIAGNOSIS — J12.82 PNEUMONIA DUE TO CORONAVIRUS DISEASE 2019: ICD-10-CM

## 2021-11-26 DIAGNOSIS — D64.9 ANEMIA, UNSPECIFIED: ICD-10-CM

## 2021-11-26 DIAGNOSIS — K21.9 GASTRO-ESOPHAGEAL REFLUX DISEASE WITHOUT ESOPHAGITIS: ICD-10-CM

## 2021-11-26 DIAGNOSIS — E87.2 ACIDOSIS: ICD-10-CM

## 2021-11-26 DIAGNOSIS — A41.89 OTHER SPECIFIED SEPSIS: ICD-10-CM

## 2022-01-26 PROBLEM — Z00.00 ENCOUNTER FOR PREVENTIVE HEALTH EXAMINATION: Status: ACTIVE | Noted: 2022-01-26

## 2022-01-31 ENCOUNTER — APPOINTMENT (OUTPATIENT)
Dept: CARDIOTHORACIC SURGERY | Facility: CLINIC | Age: 72
End: 2022-01-31

## 2023-12-28 NOTE — DIETITIAN INITIAL EVALUATION ADULT. - OTHER INFO
Continue Regimen: Fluticasone cream apply twice daily PRN Render In Strict Bullet Format?: No Detail Level: Zone Per chart pt with emphysema, HLD, hypothyroidism who presents from home after testing positive for COVID PTA with increased SOB and cough, found with Acute hypoxic Respiratory failure secondary to COVID PNA. Also found with bilateral PE.  Pt on COVID isolation precautions, currently on NC. Unable to reach pt via room phone. Attempt to reach pt's daughter via phone also unsuccessful.   Per ED Provider note pt had poor appetite and PO intake x 1 day PTA.  Per flow sheets pt consuming 50-95% of documented meals during LOS. No reports of nausea, vomiting, constipation or diarrhea.   Pt with HbA1c 6.0% indicating prediabetes- MD aware.   Will follow up with nutrition education as per protocol. RD remains available.

## 2024-06-14 NOTE — PATIENT PROFILE ADULT - DO YOU FEEL LIKE HURTING YOURSELF OR OTHERS?
SW following for discharge planning.           SW: Patient is anticipated to discharge to [facility] once medically cleared for discharge.           SW to continue to follow for discharge planning pending further medical management.                    Edmundo Longoria [facility]      [phone]                    no

## 2025-02-25 NOTE — DIETITIAN INITIAL EVALUATION ADULT. - OTHER CALCULATIONS
Attending
Ht (cm): 165.1cm   Wt (kg): 71.6kg (dosing weight 11/11)   BMI: 26.3     IBW: 61.6kg +/- 10% %IBW: 116% UBW: unknown %UBW: n/a